# Patient Record
Sex: MALE | Race: WHITE
[De-identification: names, ages, dates, MRNs, and addresses within clinical notes are randomized per-mention and may not be internally consistent; named-entity substitution may affect disease eponyms.]

---

## 2018-01-01 ENCOUNTER — HOSPITAL ENCOUNTER (EMERGENCY)
Dept: HOSPITAL 52 - LL.ED | Age: 43
LOS: 1 days | Discharge: HOME | End: 2018-01-02
Payer: MEDICAID

## 2018-01-01 DIAGNOSIS — J06.9: Primary | ICD-10-CM

## 2018-01-01 DIAGNOSIS — F17.210: ICD-10-CM

## 2018-01-01 DIAGNOSIS — Z79.899: ICD-10-CM

## 2018-01-01 DIAGNOSIS — M19.90: ICD-10-CM

## 2018-01-01 DIAGNOSIS — I10: ICD-10-CM

## 2018-01-01 DIAGNOSIS — E78.00: ICD-10-CM

## 2018-01-01 DIAGNOSIS — J45.909: ICD-10-CM

## 2018-01-01 DIAGNOSIS — K21.9: ICD-10-CM

## 2018-01-01 DIAGNOSIS — E66.9: ICD-10-CM

## 2018-01-01 DIAGNOSIS — Z71.6: ICD-10-CM

## 2018-01-01 PROCEDURE — 87081 CULTURE SCREEN ONLY: CPT

## 2018-01-01 PROCEDURE — 87804 INFLUENZA ASSAY W/OPTIC: CPT

## 2018-01-01 PROCEDURE — 99283 EMERGENCY DEPT VISIT LOW MDM: CPT

## 2018-01-01 PROCEDURE — 96372 THER/PROPH/DIAG INJ SC/IM: CPT

## 2018-01-01 PROCEDURE — 87430 STREP A AG IA: CPT

## 2018-01-02 NOTE — EDM.PDOC
ED HPI GENERAL MEDICAL PROBLEM





- General


Chief Complaint: General


Stated Complaint: sore throat


Time Seen by Provider: 01/02/18 00:10


Source of Information: Reports: Patient, Family (Wife ), Old Records ([facility] EMR.  No paper hospital chart available.)


History Limitations: Reports: No Limitations





- History of Present Illness


INITIAL COMMENTS - FREE TEXT/NARRATIVE: 





Patient was brought to the emergency room via private automobile by his wife 

for evaluation of progressive URI symptoms including sore throat, throat 

fullness, nasal drainage, and yellowish productive cough with symptoms starting 

on about 12/27. Patient has been exposed to many individuals with URI symptoms, 

however no known direct exposure to strep, influenza, mononucleosis, etc. 

Patient did get his influenza booster this season. He has also had some 

intermittent fever and chills, however has not measured his temperature. No 

recent use of antipyretic medication other than low-dose Tylenol PM earlier 

this evening. Patient did start using his inhaler earlier today. He has not 

missed any work to this point. The patient denies any chest pain/pressure, 

heart flutter, dizziness, orthostasis, orthopnea, diaphoresis, paresthesias, 

recent decreased exercise tolerance, or any other anginal-type symptoms. No 

recent history of abdominal pain, heartburn, nausea, diarrhea, melena, gross 

hematochezia, or any food intolerance, including fatty foods, etc.. He has also 

been using Cepacol lozenges and Chloraseptic spray


Onset: Gradual


Onset Date: 12/27/17


Duration: Constant, Getting Worse


Location: Reports: Head (Throat pain is about).  Denies: Face, Neck, Chest, 

Abdomen, Back, Upper Extremity, Left, Upper Extremity, Right, Radiates to


Quality: Reports: Ache, Same as Previous Episode


Severity: Moderate


Improves with: Reports: None


Worsens with: Reports: None


Context: Reports: Other (As above)


Associated Symptoms: Reports: Cough, cough w sputum, Fever/Chills.  Denies: 

Confusion, Chest Pain, Diaphoresis, Headaches, Loss of Appetite, Malaise, Nausea

/Vomiting, Rash, Seizure, Shortness of Breath, Syncope, Weakness


Treatments PTA: Reports: Acetaminophen, Other Medication(s)


Other Treatments PTA: Patient states he took 1 tab Tylenol PM approx. 2130


  ** Oral/Mouth


Pain Score (Numeric/FACES): 8





- Related Data


 Allergies











Allergy/AdvReac Type Severity Reaction Status Date / Time


 


No Known Allergies Allergy   Verified 01/01/18 23:59











Home Meds: 


 Home Meds





Acetaminophen/Diphenhydramine [Tylenol Pm Ex-Strength Caplet] 1 each PO 

ASDIRECTED PRN 01/02/18 [History]


Albuterol Sulfate [Proair Hfa] 8.5 gm IH ASDIRECTED PRN 01/02/18 [History]


Benazepril [Lotensin] 10 mg PO DAILY 01/02/18 [History]


Diclofenac Sodium [Voltaren] 50 mg PO ASDIRECTED PRN 01/02/18 [History]


Montelukast [Singulair] 1 tab PO DAILY 01/02/18 [History]


Pantoprazole [ProTONIX***] 1 cap PO DAILY 01/02/18 [History]


guaiFENesin/Dextromethorphan [Mucinex DM ER 1,200-60 MG] 1 tab PO BID #20 

tbmp.12hr 01/02/18 [Rx]











Past Medical History


HEENT History: Reports: Impaired Vision, Other (See Below).  Denies: Allergic 

Rhinitis, Cataract, Glaucoma, Hard of Hearing, Macular Degeneration, Retinal 

Detachment


Other HEENT History: Patient wears glasses


Cardiovascular History: Reports: High Cholesterol, Hypertension.  Denies: Afib, 

Aneurysm, Arrhythmia, Blood Clots/VTE/DVT, CAD, Heart Murmur, MI, Syncope


Respiratory History: Reports: Asthma.  Denies: COPD, Intubation, Previous, PE, 

Pneumothorax, Sleep Apnea


Gastrointestinal History: Reports: GERD, Pancreatitis, Other (See Below).  

Denies: Celiac Disease, Cholelithiasis, Chronic Constipation, Chronic Diarrhea, 

Gastritis, GI Bleed, Hepatitis, Inflammatory Bowel Disease, Irritable Bowel 

Syndrome, Jaundice, PUD


Other Gastrointestinal History: Pancreatitis of unknown etiology in about 2013


Genitourinary History: Denies: Acute Renal Failure, BPH, Chronic Renal 

Insuffiency, Renal Calculus, STD, Urinary Incontinence, UTI, Recurrent


Musculoskeletal History: Reports: Arthritis, Back Pain, Chronic, Neck Pain, 

Chronic, Osteoarthritis.  Denies: Fracture, Gout, RA, SLE


Neurological History: Reports: None.  Denies: Cerebral Aneurysms, Concussion, 

CVA, Headaches, Chronic, Head Trauma, Migraines, MS, Neuropathy, Peripheral, 

Parkinson's, Seizure, TIA


Psychiatric History: Denies: Abuse, Victim of, ADD, ADHD, Addiction, Anxiety, 

Depression, Psych Hospitalization(s), PTSD, Suicide Attempt, Suicidal Ideation


Endocrine/Metabolic History: Reports: Obesity/BMI 30+.  Denies: Diabetes, Type I

, Diabetes, Type II, Hypothyroidism, IDDM


Hematologic History: Reports: None.  Denies: Anemia, Blood Transfusion(s), Iron 

Deficiency


Immunologic History: Reports: None.  Denies: AIDS, HIV, SLE


Oncologic (Cancer) History: Reports: None.  Denies: Basal Cell Carcinoma, 

Hodgkin's Lymphoma, Leukemia, Lymphoma, Malignant Melanoma, Non-Hodgkin's 

Lymphoma, Squamous Cell Carcinoma


Dermatologic History: Reports: Other (See Below).  Denies: Eczema, Psoriasis


Other Dermatologic History: Acne vulgaris previously





- Infectious Disease History


Infectious Disease History: Denies: C-Difficile, Chicken Pox, Measles, 

Meningitis, Mononucleosis, MRSA, Mumps, Pertussis (Whooping Cough), Rheumatic 

Fever, Rubella, Scarlet Fever, Shingles, TB, VRE





- Past Surgical History


Head Surgeries/Procedures: Reports: None


HEENT Surgical History: Reports: Oral Surgery, Other (See Below).  Denies: 

Adenoidectomy, Eye Surgery, Laser Surgery, LASIK, Myringotomy w Tube(s), Naso-

Sinus Surgery, Tonsillectomy


Other HEENT Surgeries/Procedures: [place] teeth extraction 4 at about age 15


Cardiovascular Surgical History: Reports: None.  Denies: Varicose


Respiratory Surgical History: Reports: None.  Denies: Thoracentesis


GI Surgical History: Denies: Appendectomy, Cholecystectomy, Colonoscopy, EGD, 

Hernia, Abdominal, Hernia, Inguinal, Hernia Repair/Other


Male  Surgical History: Reports: Circumcision.  Denies: TURP-Transurethral 

Resection of Prostate, Vasectomy


Other Male  Surgeries/Procedures: Circumcision as an infant


Endocrine Surgical History: Reports: None.  Denies: Thyroid Biopsy


Neurological Surgical History: Reports: None.  Denies: C-Spine, Discectomy, 

Intracranial, Laminectomy, Lumbar Spine, Sacral Spine, Spinal Fusion, 

Vertebroplasty


Musculoskeletal Surgical History: Reports: None.  Denies: Arthroscopic Procedure

, Carpal Tunnel, Ganglion Cyst, Joint Replacement, ORIF, Shoulder Surgery


Oncologic Surgical History: Reports: None


Dermatological Surgical History: Reports: None





- Past Imaging History


Past Imaging History: Reports: CAT Scan (CT of the cervical spine in 2017), MRI 

(MRI of lumbar spine in about 2008)





Social & Family History





- Tobacco Use


Smoking Status *Q: Current Every Day Smoker


Tobacco Use Within Last Twelve Months: Cigarettes


Years of Tobacco use: 31


Packs/Tins Daily: 0.3 (Maximum of 1 packs per day; patient started smoking at 

age 11)


Used Tobacco, but Quit: No


Smoking Cessation Information Provided To Patient: Yes


Second Hand Smoke Exposure: No


Second Hand Smoke Education Provided: No





- Caffeine Use


Caffeine Use: Reports: Coffee (2 cups per day), Energy Drinks (4 per day), Soda 

(3 sodas per week).  Denies: Tea





- Alcohol Use


Alcohol Use History: Yes


Days Per Week of Alcohol Use: 0 (No previous DWIs, problems with alcohol abuse, 

etc.)


Number of Drinks Per Day: 1 (Straight whiskey usually once every couple months)


Total Drinks Per Week: 0





- Recreational Drug Use


Recreational Drug Use: No


Drug Use in Last 12 Months: No


Recreational Drug Type: Denies: Amphetamines (Speed), Cocaine, Heroin, 

Inhalants (Glues, Solvents, Aerosols), LSD (Acid), Marijuana/Hashish, 

Methamphetamine, Morphine





- Living Situation & Occupation


Living situation: Reports:  (2007, 3 children), with Family (Wife and 

children)


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





ED ROS GENERAL





- Review of Systems


Review Of Systems: See Below


Constitutional: Reports: Fever, Chills, Night Sweats.  Denies: Weakness, Fatigue

, Diaphoresis, Decreased Appetite, Weight Loss, Weight Gain


HEENT: Reports: Rhinitis, Throat Pain, Throat Swelling.  Denies: Dental Pain, 

Ear Discharge, Ear Pain, Eye Pain, Glasses, Vertigo, Vision Change


Respiratory: Reports: Cough, Sputum.  Denies: Shortness of Breath, Wheezing, 

Pleuritic Chest Pain


Cardiovascular: Reports: No Symptoms.  Denies: Chest Pain, Blood Pressure 

Problem, Dyspnea on Exertion, Edema, Lightheadedness, Orthopnea, Palpitations, 

Syncope


Endocrine: Reports: No Symptoms.  Denies: Fatigue


GI/Abdominal: Reports: Difficulty Swallowing (Secondary to pharyngitis).  Denies

: Abdominal Pain, Anorexia, Black Stool, Bloody Stool, Constipation, Diarrhea, 

Decreased Appetite, Distension, Flatus, Hematochezia, Melena, Nausea, Stool 

Incontinence, Vomiting


: Reports: No Symptoms.  Denies: Discharge, Dysuria, Flank Pain, Frequency, 

Hematuria, Incontinence, Pain, Urgency, Urinary Retention


Musculoskeletal: Reports: No Symptoms.  Denies: Neck Pain, Shoulder Pain, Arm 

Pain, Back Pain, Leg Pain


Skin: Reports: No Symptoms.  Denies: Diaphoresis, Rash, Wound


Neurological: Reports: No Symptoms.  Denies: Confusion, Dizziness, Headache, 

Numbness, Paresthesia, Seizure, Tingling


Psychiatric: Reports: No Symptoms.  Denies: Agitation, Anxiety, Confusion, 

Depression, Hallucinations, Suicidal Ideation


Hematologic/Lymphatic: Reports: No Symptoms


Immunologic: Reports: No Symptoms





ED EXAM, GENERAL





- Physical Exam


Exam: See Below


Exam Limited By: No Limitations


General Appearance: Alert, WD/WN, No Apparent Distress


Eye Exam: Bilateral Eye: EOMI, Normal Inspection (No nystagmus), PERRL


Ears: Normal External Exam, Normal Canal, Hearing Grossly Normal, Normal TMs


Nose: Normal Mucosa, No Blood, Nasal Drainage, Clear Rhinorrhea (Mild bilateral)

.  No: Nasal Tenderness, Nasal Swelling


Throat/Mouth: Normal Lips, Normal Teeth, Normal Gums, Normal Oropharynx (+2 

erythema in the posterior pharynx with no pinpoint white exudates or 

peritonsillar abscess, uvular swelling, etc.), No Airway Compromise.  No: 

Normal Voice (Moderate hoarseness), Dysphagia, Perioral Cyanosis


Head: Atraumatic, Normocephalic.  No: Facial Swelling, Facial Tenderness, Sinus 

Tenderness


Neck: Normal Inspection, Supple, Non-Tender, Full Range of Motion.  No: 

Lymphadenopathy (L), Lymphadenopathy (R), Thyromegaly


Respiratory/Chest: No Respiratory Distress, Lungs Clear, Normal Breath Sounds, 

No Accessory Muscle Use, Chest Non-Tender.  No: Pleural Rub, Retractions


Cardiovascular: Normal Peripheral Pulses, Regular Rate, Rhythm, No Edema, No 

Gallop, No JVD, No Murmur, No Rub.  No: Gallop/S3, Gallop/S4, Friction Rub


Peripheral Pulses: 2+: Radial (L), Radial (R)


GI/Abdominal: Normal Bowel Sounds, Soft, Non-Tender, No Organomegaly, No 

Distention, No Abnormal Bruit, No Mass, Other (obese).  No: Guarding


 (Male) Exam: Deferred


Rectal (Males) Exam: Deferred


Back Exam: Normal Inspection, Full Range of Motion.  No: CVA Tenderness (L), 

CVA Tenderness (R), Muscle Spasm


Extremities: Normal Range of Motion, Non-Tender, No Pedal Edema, Normal 

Capillary Refill.  No: Pedal Edema (Trace bilateral pedal/pretibial edema), 

Ever's Sign


Neurological: Alert, Oriented, CN II-XII Intact, Normal Cognition, Normal Gait, 

No Motor/Sensory Deficits


Psychiatric: Normal Affect, Normal Mood


Skin Exam: Warm, Dry, Intact, Normal Color, No Rash.  No: Diaphoretic, Wound/

Incision


Lymphatic: No Adenopathy





Course





- Vital Signs


Last Recorded V/S: 


 Last Vital Signs











Temp  37.0 C   01/01/18 23:50


 


Pulse  95   01/01/18 23:50


 


Resp  20   01/01/18 23:50


 


BP  145/80 H  01/01/18 23:50


 


Pulse Ox  95   01/01/18 23:50














 Vital Signs - 24 hr











  01/01/18





  23:50


 


Temperature [ 37.0 C





Temporal] 


 


Pulse, 95





Peripheral [ 





Pulse Oximetry] 


 


Respiratory 20





Rate 


 


Blood Pressure 145/80 H





[Left Upper Arm 





] 


 


O2 Sat by Pulse 95





Oximetry 














- Orders/Labs/Meds


Orders: 


 Active Orders 24 hr











 Category Date Time Status


 


 CULTURE STREP A CONFIRMATION [] Stat Lab  01/02/18 00:20 Results


 


 STREP SCRN A RAPID W CULT CONF [] Stat Lab  01/02/18 00:17 Ordered


 


 Obtain Past Medical Record [OM.PC] Routine Oth  01/02/18 00:17 Active











Labs: 








 Microbiology











 01/02/18 00:20 Influenza Type A Antigen Screen - Final





 Nasopharyngeal Swab - Nare, Right    NEGATIVE INFLUENZA A VIRUS AG





 Influenza Type B Antigen Screen - Final





    NEGATIVE INFLUENZA B VIRUS AG


 


 01/02/18 00:20 Group A Streptococcus Rapid Screen - Final





 Throat    NEGATIVE STREP A SCREEN











Meds: 


Medications














Discontinued Medications














Generic Name Dose Route Start Last Admin





  Trade Name Freq  PRN Reason Stop Dose Admin


 


Methylprednisolone Acetate  80 mg  01/02/18 00:22  01/02/18 00:25





  Depo-Medrol  IM  01/02/18 00:23  80 mg





  ONETIME ONE   Administration














- Radiology Interpretation


Free Text/Narrative:: 





None





Departure





- Departure


Time of Disposition: 01:00


Disposition: Home, Self-Care 01


Condition: Good


Clinical Impression: 


 Asthma, Hypertension, Osteoarthritis, Obesity (BMI 35.0-39.9 without 

comorbidity), Peptic reflux disease, Respiratory infection, upper, Tobacco 

abuse counseling








- Discharge Information


Prescriptions: 


guaiFENesin/Dextromethorphan [Mucinex DM ER 1,200-60 MG] 1 tab PO BID #20 

tbmp.12hr


Instructions:  Sore Throat, Easy-to-Read


Referrals: 


Nidia Mooney PA [Primary Care Provider] - 


Forms:  ED Department Discharge, ED Return to Work/School Form


Additional Instructions: 


1.  Follow up with your regular provider in 10-14 days as needed, if symptoms 

persist.


2.  Tylenol 650 mg by mouth every 4 hours and/or OTC ibuprofen 2-3 tabs by 

mouth every 6 hours with food as directed./needed.


3.  Listerine gargles four times per day, after meals and at bedtime, with 

additional Chloroseptic lozenges or spray as needed for 10 days and/or until 

symptoms resolve.


4.  Hygiene precautions as discussed


5.  Weight loss in moderation


6.  Stop all tobacco use ASAP as directed/per provided information and consider 

contacting Quit LIne, etc..


7.  Work excuse- See Form





- Problem List & Annotations


(1) Respiratory infection, upper


SNOMED Code(s): 53024233


   Code(s): J06.9 - ACUTE UPPER RESPIRATORY INFECTION, UNSPECIFIED   Status: 

Acute   Priority: High   Onset Date: ~12/27/17   Annotation/Comment:: IM Depo-

Medrol given for throat inflammation and as asthma prophylaxis as below. No 

indication for antibiotic therapy at this time with likely viral pharyngitis, 

viral bronchitis and mild URI symptoms. Symptomatic relief as per discharge 

instructions. Work excuse provided   


Qualifiers: 


   URI type: acute pharyngitis 





(2) Asthma


SNOMED Code(s): 535409930


   Code(s): J45.909 - UNSPECIFIED ASTHMA, UNCOMPLICATED   Status: Chronic   

Priority: Medium   Annotation/Comment:: No significant asthma exacerbation or 

current wheezes, etc. Note probable viral bronchitis. Compliance with inhaler 

encouraged.   


Qualifiers: 


   Asthma severity: mild   Asthma persistence: intermittent   Asthma 

complication type: uncomplicated   Qualified Code(s): J45.20 - Mild 

intermittent asthma, uncomplicated   





(3) Hypertension


SNOMED Code(s): 37650003


   Code(s): I10 - ESSENTIAL (PRIMARY) HYPERTENSION   Status: Chronic   Priority

: Medium   Annotation/Comment:: Stable by history. Currently treated   


Qualifiers: 


   Hypertension type: essential hypertension   Qualified Code(s): I10 - 

Essential (primary) hypertension   





(4) Obesity (BMI 35.0-39.9 without comorbidity)


SNOMED Code(s): 003959929


   Code(s): E66.9 - OBESITY, UNSPECIFIED   Status: Chronic   Priority: Medium   

Annotation/Comment:: Weight loss in moderation advised   





(5) Osteoarthritis


SNOMED Code(s): 009218585


   Code(s): M19.90 - UNSPECIFIED OSTEOARTHRITIS, UNSPECIFIED SITE   Status: 

Chronic   Priority: Medium   Annotation/Comment:: Stable by history   


Qualifiers: 


   Osteoarthritis location: multiple joints   Osteoarthritis type: primary   

Qualified Code(s): M15.0 - Primary generalized (osteo)arthritis   





(6) Peptic reflux disease


SNOMED Code(s): 84305106


   Code(s): K21.9 - GASTRO-ESOPHAGEAL REFLUX DISEASE WITHOUT ESOPHAGITIS   

Status: Chronic   Priority: Medium   Annotation/Comment:: Stable under current 

medical therapy   





(7) Tobacco abuse counseling


SNOMED Code(s): 871561041, 249314946


   Code(s): Z71.6 - TOBACCO ABUSE COUNSELING   Status: Chronic   Priority: 

Medium   Annotation/Comment:: Tobacco cessation strongly encouraged with 

information provided at discharge   





- Problem List Review


Problem List Initiated/Reviewed/Updated: Yes





- My Orders


Last 24 Hours: 


My Active Orders





01/02/18 00:17


STREP SCRN A RAPID W CULT CONF [RM] Stat 


Obtain Past Medical Record [OM.PC] Routine 





01/02/18 00:20


CULTURE STREP A CONFIRMATION [RM] Stat 














- Assessment/Plan


Last 24 Hours: 


My Active Orders





01/02/18 00:17


STREP SCRN A RAPID W CULT CONF [RM] Stat 


Obtain Past Medical Record [OM.PC] Routine 





01/02/18 00:20


CULTURE STREP A CONFIRMATION [RM] Stat 











Assessment:: 





As above


Plan: 





As above. Extensive precautions were given to the patient and his wife, who are 

in agreement with the treatment plan. See Patient Instructions for further 

treatment and plan.

## 2018-02-28 ENCOUNTER — HOSPITAL ENCOUNTER (EMERGENCY)
Dept: HOSPITAL 52 - LL.ED | Age: 43
Discharge: HOME | End: 2018-02-28
Payer: MEDICAID

## 2018-02-28 DIAGNOSIS — Z79.899: ICD-10-CM

## 2018-02-28 DIAGNOSIS — I10: ICD-10-CM

## 2018-02-28 DIAGNOSIS — M54.41: Primary | ICD-10-CM

## 2018-02-28 DIAGNOSIS — E78.00: ICD-10-CM

## 2018-02-28 DIAGNOSIS — F17.210: ICD-10-CM

## 2018-02-28 NOTE — EDM.PDOC
ED HPI GENERAL MEDICAL PROBLEM





- General


Chief Complaint: General


Stated Complaint: Work Related Injury


Time Seen by Provider: 02/28/18 10:15


Source of Information: Reports: Patient


History Limitations: Reports: No Limitations





- History of Present Illness


INITIAL COMMENTS - FREE TEXT/NARRATIVE: 





Patient presents with acute low back pain that started last evening when he was 

working at [employer]. Patient was moving a cart full of water when he felt sudden 

mid low back pain.  Complains of radiation down right leg when he is standing 

at times. Does have history of previous low back pain episodes in past but 

denies chronic daily pain. No bowel or bladder dysfunction.  No numbness/

tingling down legs. Denies other complaints. Patient has history of obesity, 

asthma, high blood pressure and GERD. He did tell his employer last night that 

his back hurt.  This morning after waking up patient noted that overall pain 

was worse and that is when he came to the ER. 


Treatments PTA: Reports: Cold Therapy, NSAIDS


  ** Lower Back


Pain Score (Numeric/FACES): 8





- Related Data


 Allergies











Allergy/AdvReac Type Severity Reaction Status Date / Time


 


No Known Allergies Allergy   Verified 01/01/18 23:59











Home Meds: 


 Home Meds





Albuterol Sulfate [Proair Hfa] 8.5 gm IH ASDIRECTED PRN 01/02/18 [History]


Benazepril [Lotensin] 10 mg PO DAILY 01/02/18 [History]


Montelukast [Singulair] 1 tab PO DAILY 01/02/18 [History]


Pantoprazole [ProTONIX***] 1 cap PO DAILY 01/02/18 [History]


Cyclobenzaprine [Flexeril] 10 mg PO TID PRN #15 tab 02/28/18 [Rx]


Gabapentin [Neurontin] 300 mg PO DAILY 02/28/18 [History]


Non-Formulary Medication [NF Drug] 1 applic TOP DAILY 02/28/18 [History]


Prednisone [IMW: predniSONE] 40 mg PO WITHBREAKFAST #10 tab 02/28/18 [Rx]


traMADol [Ultram] 50 mg PO Q6H PRN #15 tab 02/28/18 [Rx]











Past Medical History


HEENT History: Reports: Impaired Vision, Other (See Below).  Denies: Allergic 

Rhinitis, Cataract, Glaucoma, Hard of Hearing, Macular Degeneration, Retinal 

Detachment


Other HEENT History: Patient wears glasses


Cardiovascular History: Reports: High Cholesterol, Hypertension.  Denies: Afib, 

Aneurysm, Arrhythmia, Blood Clots/VTE/DVT, CAD, Heart Murmur, MI, Syncope


Respiratory History: Reports: Asthma.  Denies: COPD, Intubation, Previous, PE, 

Pneumothorax, Sleep Apnea


Gastrointestinal History: Reports: GERD, Pancreatitis, Other (See Below).  

Denies: Celiac Disease, Cholelithiasis, Chronic Constipation, Chronic Diarrhea, 

Gastritis, GI Bleed, Hepatitis, Inflammatory Bowel Disease, Irritable Bowel 

Syndrome, Jaundice, PUD


Other Gastrointestinal History: Pancreatitis of unknown etiology in about 2013


Musculoskeletal History: Reports: Arthritis, Back Pain, Chronic, Neck Pain, 

Chronic, Osteoarthritis.  Denies: Fracture, Gout, RA, SLE


Neurological History: Reports: None.  Denies: Cerebral Aneurysms, Concussion, 

CVA, Headaches, Chronic, Head Trauma, Migraines, MS, Neuropathy, Peripheral, 

Parkinson's, Seizure, TIA


Endocrine/Metabolic History: Reports: Obesity/BMI 30+.  Denies: Diabetes, Type I

, Diabetes, Type II, Hypothyroidism, IDDM


Hematologic History: Reports: None.  Denies: Anemia, Blood Transfusion(s), Iron 

Deficiency


Immunologic History: Reports: None.  Denies: AIDS, HIV, SLE


Oncologic (Cancer) History: Reports: None.  Denies: Basal Cell Carcinoma, 

Hodgkin's Lymphoma, Leukemia, Lymphoma, Malignant Melanoma, Non-Hodgkin's 

Lymphoma, Squamous Cell Carcinoma


Dermatologic History: Reports: Other (See Below).  Denies: Eczema, Psoriasis


Other Dermatologic History: Acne vulgaris previously





- Past Surgical History


Head Surgeries/Procedures: Reports: None


HEENT Surgical History: Reports: Oral Surgery, Other (See Below).  Denies: 

Adenoidectomy, Eye Surgery, Laser Surgery, LASIK, Myringotomy w Tube(s), Naso-

Sinus Surgery, Tonsillectomy


Other HEENT Surgeries/Procedures: [place] teeth extraction 4 at about age 15


Cardiovascular Surgical History: Reports: None.  Denies: Varicose


Respiratory Surgical History: Reports: None.  Denies: Thoracentesis


Male  Surgical History: Reports: Circumcision.  Denies: TURP-Transurethral 

Resection of Prostate, Vasectomy


Other Male  Surgeries/Procedures: Circumcision as an infant


Endocrine Surgical History: Reports: None.  Denies: Thyroid Biopsy


Neurological Surgical History: Reports: None.  Denies: C-Spine, Discectomy, 

Intracranial, Laminectomy, Lumbar Spine, Sacral Spine, Spinal Fusion, 

Vertebroplasty


Musculoskeletal Surgical History: Reports: None.  Denies: Arthroscopic Procedure

, Carpal Tunnel, Ganglion Cyst, Joint Replacement, ORIF, Shoulder Surgery


Oncologic Surgical History: Reports: None


Dermatological Surgical History: Reports: None





- Past Imaging History


Past Imaging History: Reports: CAT Scan (CT of the cervical spine in 2017), MRI 

(MRI of lumbar spine in about 2008)





Social & Family History





- Family History


Family Medical History: Noncontributory (for complaint)





- Tobacco Use


Smoking Status *Q: Current Every Day Smoker


Years of Tobacco use: 31


Packs/Tins Daily: 0.3


Used Tobacco, but Quit: No


Second Hand Smoke Exposure: No





- Caffeine Use


Caffeine Use: Reports: Coffee, Energy Drinks, Soda


Caffeine Use Comment: Rarely





- Alcohol Use


Days Per Week of Alcohol Use: 0 (No previous DWIs, problems with alcohol abuse, 

etc.)


Number of Drinks Per Day: 1 (Straight whiskey usually once every couple months)


Total Drinks Per Week: 0





- Recreational Drug Use


Recreational Drug Use: No


Drug Use in Last 12 Months: No





- Living Situation & Occupation


Living situation: Reports:  (2007, 3 children), with Family (Wife and 

children)


Occupation: Employed (Manager at [employer])





ED ROS GENERAL





- Review of Systems


Review Of Systems: See Below


Constitutional: Reports: No Symptoms


HEENT: Reports: No Symptoms


Respiratory: Reports: No Symptoms


Cardiovascular: Reports: No Symptoms


GI/Abdominal: Reports: No Symptoms.  Denies: Stool Incontinence


: Reports: No Symptoms.  Denies: Incontinence


Musculoskeletal: Reports: Back Pain, Leg Pain


Skin: Reports: No Symptoms


Neurological: Reports: No Symptoms.  Denies: Numbness, Paresthesia, Tingling


Psychiatric: Reports: No Symptoms





ED EXAM, GENERAL





- Physical Exam


Exam: See Below


Exam Limited By: No Limitations


General Appearance: Alert, WD/WN, Other (uncomfortable when changing position)


Eye Exam: Bilateral Eye: EOMI, PERRL


Ears: Normal External Exam


Head: Atraumatic, Normocephalic


Respiratory/Chest: No Respiratory Distress, Lungs Clear, Normal Breath Sounds, 

No Accessory Muscle Use


Cardiovascular: Regular Rate, Rhythm, No Murmur


GI/Abdominal: Soft, Non-Tender, Other (obese)


 (Male) Exam: Deferred


Rectal (Males) Exam: Deferred


Back Exam: Paraspinal Tenderness (bilaterally, mid to lower back, left greater 

than right. No tenderness with palpation in gluteal area. ).  No: Muscle Spasm, 

Vertebral Tenderness


Extremities: Non-Tender, Normal Capillary Refill.  No: Leg Pain


Neurological: Alert, Oriented, Normal Cognition, Normal Gait, Normal Reflexes, 

No Motor/Sensory Deficits


Psychiatric: Normal Affect, Normal Mood


Skin Exam: Warm, Dry, Intact, Normal Color





Course





- Vital Signs


Last Recorded V/S: 


 Last Vital Signs











Temp  36.2 C   02/28/18 09:37


 


Pulse  84   02/28/18 09:37


 


Resp  18   02/28/18 09:37


 


BP  164/95 H  02/28/18 09:37


 


Pulse Ox  98   02/28/18 09:37














- Orders/Labs/Meds


Orders: 


 Active Orders 24 hr











 Category Date Time Status


 


 Lumbar Spine Min 4V [CR] Stat Exams  02/28/18 09:38 Taken














- Radiology Interpretation


Free Text/Narrative:: 





Some narrowing of disc space L4/5, some degenerative changes noted. 





- Re-Assessments/Exams


Free Text/Narrative Re-Assessment/Exam: 





02/28/18 11:21


Acute low back pain/sciatica. 


Conservative treatment at this time, including rest/ice/steroids, PRN Flexeril 

and Tramadol. 


OK to return to restricted duty on Friday. To follow up with primary clinic 

Monday for recheck and further planning as needed, such as MRI consideration if 

pain has not significantly improved. Patient is in agreement with plan. 





Departure





- Departure


Time of Disposition: 10:39


Disposition: Home, Self-Care 01


Condition: Good


Clinical Impression: 


Low back pain


Qualifiers:


 Chronicity: acute Back pain laterality: bilateral Sciatica presence: with 

sciatica Sciatica laterality: sciatica of right side Qualified Code(s): M54.41 

- Lumbago with sciatica, right side








- Discharge Information


Prescriptions: 


Cyclobenzaprine [Flexeril] 10 mg PO TID PRN #15 tab


 PRN Reason: Spasms


Prednisone [IMW: predniSONE] 40 mg PO WITHBREAKFAST #10 tab


traMADol [Ultram] 50 mg PO Q6H PRN #15 tab


 PRN Reason: Pain


Instructions:  Back Pain, Adult, Easy-to-Read


Referrals: 


Nidia Mooney PA [Primary Care Provider] - 


Forms:  ED Department Discharge


Additional Instructions: 


Rest, gentle activity for today and tomorrow. You may return to work on 

restriction Friday while you finish your course of medication. Make an 

appointment to follow up with your primary provider Monday morning for re-

evluation and further restrictions as needed. Additional imaging studies may be 

needed if the pain/problems continue. 














- My Orders


Last 24 Hours: 


My Active Orders





02/28/18 09:38


Lumbar Spine Min 4V [CR] Stat 














- Assessment/Plan


Last 24 Hours: 


My Active Orders





02/28/18 09:38


Lumbar Spine Min 4V [CR] Stat

## 2020-01-01 NOTE — EDM.PDOC
ED HPI GENERAL MEDICAL PROBLEM





- General


Chief Complaint: General


Stated Complaint: shaking, rapid heart rate


Time Seen by Provider: 01/01/20 16:45


Source of Information: Reports: Patient


History Limitations: Reports: No Limitations





- History of Present Illness


INITIAL COMMENTS - FREE TEXT/NARRATIVE: 





Pt feels weak and nauseated  No emesis  No diarrhea  Did have alcohol last 

night  Also with caffeine and soup today


Onset: Gradual


Duration: Hour(s):


Location: Reports: Generalized


Associated Symptoms: Reports: Diaphoresis, Headaches, Loss of Appetite, Nausea/

Vomiting


Treatments PTA: Reports: Acetaminophen





- Related Data


 Allergies











Allergy/AdvReac Type Severity Reaction Status Date / Time


 


No Known Allergies Allergy   Verified 01/01/20 16:37











Home Meds: 


 Home Meds





Albuterol Sulfate [Proair Hfa] 8.5 gm IH ASDIRECTED PRN 01/02/18 [History]


Benazepril [Lotensin] 20 mg PO DAILY 01/02/18 [History]


Montelukast [Singulair] 1 tab PO DAILY 01/02/18 [History]


Pantoprazole [ProTONIX***] 1 cap PO DAILY 01/02/18 [History]


Gabapentin [Neurontin] 300 mg PO DAILY 02/28/18 [History]


Non-Formulary Medication [NF Drug] 1 applic TOP DAILY 02/28/18 [History]


Acetaminophen 3 tab PO ONETIME 01/01/20 [History]


Ferrous Sulfate [Iron] 6 tab PO DAILY 01/01/20 [History]


Levothyroxine 25 mcg PO DAILY 01/01/20 [History]


Milk Thistle/Nac/Dandel/Turmer [Liver Complex Tablet] 1 tab PO DAILY 01/01/20 [

History]











Past Medical History


HEENT History: Reports: Impaired Vision, Other (See Below).  Denies: Allergic 

Rhinitis, Cataract, Glaucoma, Hard of Hearing, Macular Degeneration, Retinal 

Detachment


Other HEENT History: Patient wears glasses


Cardiovascular History: Reports: High Cholesterol, Hypertension.  Denies: Afib, 

Aneurysm, Arrhythmia, Blood Clots/VTE/DVT, CAD, Heart Murmur, MI, Syncope


Respiratory History: Reports: Asthma.  Denies: COPD, Intubation, Previous, PE, 

Pneumothorax, Sleep Apnea


Gastrointestinal History: Reports: GERD, Pancreatitis, Other (See Below).  

Denies: Celiac Disease, Cholelithiasis, Chronic Constipation, Chronic Diarrhea, 

Gastritis, GI Bleed, Hepatitis, Inflammatory Bowel Disease, Irritable Bowel 

Syndrome, Jaundice, PUD


Other Gastrointestinal History: Pancreatitis of unknown etiology in about 2013


Musculoskeletal History: Reports: Arthritis, Back Pain, Chronic, Neck Pain, 

Chronic, Osteoarthritis.  Denies: Fracture, Gout, RA, SLE


Neurological History: Reports: None.  Denies: Cerebral Aneurysms, Concussion, 

CVA, Headaches, Chronic, Head Trauma, Migraines, MS, Neuropathy, Peripheral, 

Parkinson's, Seizure, TIA


Endocrine/Metabolic History: Reports: Obesity/BMI 30+.  Denies: Diabetes, Type I

, Diabetes, Type II, Hypothyroidism, IDDM


Hematologic History: Reports: None.  Denies: Anemia, Blood Transfusion(s), Iron 

Deficiency


Immunologic History: Reports: None.  Denies: AIDS, HIV, SLE


Oncologic (Cancer) History: Reports: None.  Denies: Basal Cell Carcinoma, 

Hodgkin's Lymphoma, Leukemia, Lymphoma, Malignant Melanoma, Non-Hodgkin's 

Lymphoma, Squamous Cell Carcinoma


Dermatologic History: Reports: Other (See Below).  Denies: Eczema, Psoriasis


Other Dermatologic History: Acne vulgaris previously





- Past Surgical History


Head Surgeries/Procedures: Reports: None


HEENT Surgical History: Reports: Oral Surgery, Other (See Below).  Denies: 

Adenoidectomy, Eye Surgery, Laser Surgery, LASIK, Myringotomy w Tube(s), Naso-

Sinus Surgery, Tonsillectomy


Other HEENT Surgeries/Procedures: [place] teeth extraction 4 at about age 15


Cardiovascular Surgical History: Reports: None.  Denies: Varicose


Respiratory Surgical History: Reports: None.  Denies: Thoracentesis


Male  Surgical History: Reports: Circumcision.  Denies: TURP-Transurethral 

Resection of Prostate, Vasectomy


Other Male  Surgeries/Procedures: Circumcision as an infant


Endocrine Surgical History: Reports: None.  Denies: Thyroid Biopsy


Neurological Surgical History: Reports: None.  Denies: C-Spine, Discectomy, 

Intracranial, Laminectomy, Lumbar Spine, Sacral Spine, Spinal Fusion, 

Vertebroplasty


Musculoskeletal Surgical History: Reports: None.  Denies: Arthroscopic Procedure

, Carpal Tunnel, Ganglion Cyst, Joint Replacement, ORIF, Shoulder Surgery


Oncologic Surgical History: Reports: None


Dermatological Surgical History: Reports: None





- Past Imaging History


Past Imaging History: Reports: CAT Scan (CT of the cervical spine in 2017), MRI 

(MRI of lumbar spine in about 2008)





Social & Family History





- Family History


Family Medical History: Noncontributory (for complaint)





- Caffeine Use


Caffeine Use: Reports: Coffee, Energy Drinks, Soda


Caffeine Use Comment: Rarely





- Living Situation & Occupation


Living situation: Reports:  (2007, 3 children), with Family (Wife and 

children)


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





ED ROS GENERAL





- Review of Systems


Review Of Systems: See Below


Constitutional: Reports: Diaphoresis


Respiratory: Reports: No Symptoms


Cardiovascular: Reports: Palpitations


GI/Abdominal: Reports: Nausea


Neurological: Reports: No Symptoms





ED EXAM, GENERAL





- Physical Exam


Exam: See Below


Exam Limited By: No Limitations


General Appearance: Mild Distress


Throat/Mouth: Normal Oropharynx


Neck: Supple


Respiratory/Chest: Lungs Clear


Cardiovascular: Regular Rate, Rhythm


GI/Abdominal: Soft, Non-Tender


Back Exam: Normal Inspection


Extremities: Normal Inspection





Course





- Vital Signs


Last Recorded V/S: 





 Last Vital Signs











Temp  36.4 C   01/01/20 16:36


 


Pulse  113 H  01/01/20 16:36


 


Resp  19   01/01/20 16:36


 


BP  150/92 H  01/01/20 16:36


 


Pulse Ox  96   01/01/20 16:36














- Orders/Labs/Meds


Labs: 





 Laboratory Tests











  01/01/20 01/01/20 Range/Units





  17:00 17:00 


 


WBC  6.4   (4.0-10.2)  K/uL


 


RBC  5.43 H   (4.33-5.41)  M/uL


 


Hgb  17.2 H D   (13.1-16.8)  g/dL


 


Hct  48.8   (39.0-49.0)  %


 


MCV  89.9  D   (84.0-98.0)  fL


 


MCH  31.7   (28.2-33.3)  pg


 


MCHC  35.2   (31.7-36.0)  g/dL


 


RDW  13.2   (11.2-14.1)  %


 


Plt Count  166  D   (150-350)  K/uL


 


Neut % (Auto)  31.1 L   (45.0-80.0)  %


 


Lymph % (Auto)  55.3 H   (10.0-50.0)  %


 


Mono % (Auto)  10.4   (2.0-14.0)  %


 


Eos % (Auto)  1.9   (0.0-5.0)  %


 


Baso % (Auto)  1.3   (0.0-2.0)  %


 


Neut # (Auto)  1.98   (1.40-7.00)  K/uL


 


Lymph # (Auto)  3.51 H   (0.50-3.50)  K/uL


 


Mono # (Auto)  0.66   (0.00-1.00)  K/uL


 


Eos # (Auto)  0.12   (0.00-0.50)  K/uL


 


Baso # (Auto)  0.08   (0.00-0.20)  K/uL


 


Sodium   140  (136-145)  mmol/L


 


Potassium   3.8  (3.5-5.1)  mmol/L


 


Chloride   102  ()  mmol/L


 


Carbon Dioxide   25.5  (21.0-32.0)  mmol/L


 


BUN   10  (7-18)  mg/dL


 


Creatinine   0.72  (0.51-1.17)  mg/dL


 


Est Cr Clr Drug Dosing   TNP  


 


Estimated GFR (MDRD)   > 60  mL/min


 


Glucose   130 H  ()  mg/dL


 


Calcium   9.2  (8.5-10.1)  mg/dL














- Re-Assessments/Exams


Free Text/Narrative Re-Assessment/Exam: 





01/01/20 17:28


Pt stable in ER  See lab





Departure





- Departure


Time of Disposition: 17:30


Disposition: Home, Self-Care 01


Condition: Good


Clinical Impression: 


 Weakness








- Discharge Information


Instructions:  Weakness, Easy-to-Read, Near-Syncope, Easy-to-Read


Referrals: 


Halle Wilson, NP [Primary Care Provider] - 


Additional Instructions: 


Follow up in clinic


Return to ER if worse





Sepsis Event Note





- Evaluation


Sepsis Screening Result: No Definite Risk





- Focused Exam


Vital Signs: 





 Vital Signs











  Temp Pulse Resp BP Pulse Ox


 


 01/01/20 16:36  36.4 C  113 H  19  150/92 H  96











Date Exam was Performed: 01/01/20


Time Exam was Performed: 17:25

## 2020-01-02 NOTE — PCM.HP.2
H&P History of Present Illness





- General


Date of Service: 01/02/20


Admit Problem/Dx: 





[facility] presented [facility] with complaints of not feeling well 

since yesterday around noon.  It started with feeling shaky, jittery, chilled, 

weak, body aches, dizziness and foggy headed.  He also complained of associated 

chest pain that he described as a constant light pressure (1/10 on the pain 

scale), difficulty catching his breath while coughing.  Cough is dry and worse 

with lying down.  He had eaten some Mexican soup just prior to feeling ill 

yesterday.  Since then he has only had a banana.  He has been feeling nauseated 

and only taking small drinks of fluids.  It also sounds like he has symptoms of 

sleep apnea. He was evaluated in the ER yesterday afternoon and discharged home 

after labs were found to be satisfactory.  Since his symptoms have worsened 

presents for a reevaluation.


Source of Information: Patient, Significant Other


History Limitations: Reports: No Limitations





- History of Present Illness


Onset of Symptoms: Reports: Sudden


Symptom Onset Date: 01/01/20 (around noon)


Duration of Symptoms: Reports: Day(s):, Getting Worse


Associated Symptoms: Reports: Chest Pain, Cough, Fever/Chills, Loss of Appetite

, Malaise, Nausea/Vomiting, Shortness of Breath, Weakness, Other


Other HPI/Comments: 





weakness





- Related Data


Allergies/Adverse Reactions: 


 Allergies











Allergy/AdvReac Type Severity Reaction Status Date / Time


 


No Known Allergies Allergy   Verified 01/01/20 16:37











Home Medications: 


 Home Meds





Albuterol Sulfate [Proair Hfa] 8.5 gm IH ASDIRECTED PRN 01/02/18 [History]


Montelukast [Singulair] 10 mg PO DAILY 01/02/18 [History]


Pantoprazole [ProTONIX***] 40 mg PO DAILY 01/02/18 [History]


Non-Formulary Medication [NF Drug] 1 applic TOP DAILY 02/28/18 [History]


Levothyroxine 25 mcg PO DAILY 01/01/20 [History]


Albuterol/Ipratropium [DuoNeb 3.0-0.5 MG/3 ML] 3 ml INH QID PRN 01/02/20 [

History]


Amitriptyline [Elavil] 50 mg PO BEDTIME 01/02/20 [History]


Benazepril [Lotensin] 20 mg PO BID@0800,2000 01/02/20 [History]


Budesonide/Formoterol [Symbicort 160-4.5 MCG] 2 puff INH BID@0800,2000 01/02/20 

[History]


Gabapentin [Neurontin] 800 mg PO BID@0800,2000 01/02/20 [History]


Naproxen 500 mg PO DAILY PRN 01/02/20 [History]











Past Medical History


HEENT History: Reports: Impaired Vision, Other (See Below).  Denies: Allergic 

Rhinitis, Cataract, Glaucoma, Hard of Hearing, Macular Degeneration, Retinal 

Detachment


Other HEENT History: Patient wears glasses


Cardiovascular History: Reports: High Cholesterol, Hypertension.  Denies: Afib, 

Aneurysm, Arrhythmia, Blood Clots/VTE/DVT, CAD, Heart Murmur, MI, Syncope


Respiratory History: Reports: Asthma.  Denies: COPD, Intubation, Previous, PE, 

Pneumothorax, Sleep Apnea


Gastrointestinal History: Reports: GERD, Pancreatitis, Other (See Below).  

Denies: Celiac Disease, Cholelithiasis, Chronic Constipation, Chronic Diarrhea, 

Gastritis, GI Bleed, Hepatitis, Inflammatory Bowel Disease, Irritable Bowel 

Syndrome, Jaundice, PUD


Other Gastrointestinal History: Pancreatitis of unknown etiology in about 2013


Musculoskeletal History: Reports: Arthritis, Back Pain, Chronic, Neck Pain, 

Chronic, Osteoarthritis.  Denies: Fracture, Gout, RA, SLE


Neurological History: Reports: None.  Denies: Cerebral Aneurysms, Concussion, 

CVA, Headaches, Chronic, Head Trauma, Migraines, MS, Neuropathy, Peripheral, 

Parkinson's, Seizure, TIA


Endocrine/Metabolic History: Reports: Obesity/BMI 30+.  Denies: Diabetes, Type I

, Diabetes, Type II, Hypothyroidism, IDDM


Hematologic History: Reports: None.  Denies: Anemia, Blood Transfusion(s), Iron 

Deficiency


Immunologic History: Reports: None.  Denies: AIDS, HIV, SLE


Oncologic (Cancer) History: Reports: None.  Denies: Basal Cell Carcinoma, 

Hodgkin's Lymphoma, Leukemia, Lymphoma, Malignant Melanoma, Non-Hodgkin's 

Lymphoma, Squamous Cell Carcinoma


Dermatologic History: Reports: Other (See Below).  Denies: Eczema, Psoriasis


Other Dermatologic History: Acne vulgaris previously





- Past Surgical History


Head Surgeries/Procedures: Reports: None


HEENT Surgical History: Reports: Oral Surgery, Other (See Below).  Denies: 

Adenoidectomy, Eye Surgery, Laser Surgery, LASIK, Myringotomy w Tube(s), Naso-

Sinus Surgery, Tonsillectomy


Other HEENT Surgeries/Procedures: [place] teeth extraction 4 at about age 15


Cardiovascular Surgical History: Reports: None.  Denies: Varicose


Respiratory Surgical History: Reports: None.  Denies: Thoracentesis


Male  Surgical History: Reports: Circumcision.  Denies: TURP-Transurethral 

Resection of Prostate, Vasectomy


Other Male  Surgeries/Procedures: Circumcision as an infant


Endocrine Surgical History: Reports: None.  Denies: Thyroid Biopsy


Neurological Surgical History: Reports: None.  Denies: C-Spine, Discectomy, 

Intracranial, Laminectomy, Lumbar Spine, Sacral Spine, Spinal Fusion, 

Vertebroplasty


Musculoskeletal Surgical History: Reports: None.  Denies: Arthroscopic Procedure

, Carpal Tunnel, Ganglion Cyst, Joint Replacement, ORIF, Shoulder Surgery


Oncologic Surgical History: Reports: None


Dermatological Surgical History: Reports: None





- Past Imaging History


Past Imaging History: Reports: CAT Scan (CT of the cervical spine in 2017), MRI 

(MRI of lumbar spine in about 2008)





Social & Family History





- Family History


Family Medical History: Noncontributory (for complaint)





- Tobacco Use


Smoking Status *Q: Current Every Day Smoker





- Caffeine Use


Caffeine Use: Reports: Coffee, Energy Drinks, Soda


Caffeine Use Comment: Rarely





- Living Situation & Occupation


Living situation: Reports:  (2007, 3 children), with Family (Wife and 

children)


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





H&P Review of Systems





- Review of Systems:


Review Of Systems: See Below


General: Reports: Chills, Malaise, Weakness, Fatigue, Decreased Appetite


HEENT: Reports: No Symptoms


Pulmonary: Reports: Shortness of Breath, Cough, Other (dry cough)


Cardiovascular: Reports: Chest Pain, Orthopnea (sleeps propped on 8 pillows)


Gastrointestinal: Reports: Diarrhea, Decreased Appetite, Nausea


Genitourinary: Reports: No Symptoms


Musculoskeletal: Reports: Other (body aches)


Skin: Reports: No Symptoms


Psychiatric: Reports: Anxiety


Neurological: Reports: Dizziness, Weakness


Hematologic/Lymphatic: Reports: No Symptoms


Immunologic: Reports: No Symptoms





Exam





- Exam


Exam: See Below





- Vital Signs


Vital Signs: 


 Last Vital Signs











Temp  36.4 C   01/01/20 16:36


 


Pulse  113 H  01/01/20 16:36


 


Resp  19   01/01/20 16:36


 


BP  150/92 H  01/01/20 16:36


 


Pulse Ox  96   01/01/20 16:36














- Exam


General: Alert, Oriented, Cooperative


HEENT: Conjunctiva Clear, EACs Clear, EOMI, Hearing Intact, Nares Patent, 

Posterior Pharynx Clear, Pupils Equal, Pupils Reactive, TMs Clear, Other (lips 

are dry, mucous membranes dry)


Neck: Supple, +2 Carotid Pulse wo Bruit


Lungs: Decreased Breath Sounds


Cardiovascular: Regular Rate, Regular Rhythm


GI/Abdominal Exam: Soft, Tender (mid-epigastric)


 (Male) Exam: Deferred


Rectal (Males) Exam: Deferred


Extremities: Normal Inspection, No Pedal Edema


Peripheral Pulses: 3+: Posterior Tibial (L), Posterior Tibial (R)


Skin: Warm, Dry, Intact


Neurological: Cranial Nerves Intact


Psychiatric: Alert, Anxious





- Patient Data


Lab Results Last 24 hrs: 


 Laboratory Results - last 24 hr











  01/01/20 01/01/20 Range/Units





  17:00 17:00 


 


WBC  6.4   (4.0-10.2)  K/uL


 


RBC  5.43 H   (4.33-5.41)  M/uL


 


Hgb  17.2 H D   (13.1-16.8)  g/dL


 


Hct  48.8   (39.0-49.0)  %


 


MCV  89.9  D   (84.0-98.0)  fL


 


MCH  31.7   (28.2-33.3)  pg


 


MCHC  35.2   (31.7-36.0)  g/dL


 


RDW  13.2   (11.2-14.1)  %


 


Plt Count  166  D   (150-350)  K/uL


 


Neut % (Auto)  31.1 L   (45.0-80.0)  %


 


Lymph % (Auto)  55.3 H   (10.0-50.0)  %


 


Mono % (Auto)  10.4   (2.0-14.0)  %


 


Eos % (Auto)  1.9   (0.0-5.0)  %


 


Baso % (Auto)  1.3   (0.0-2.0)  %


 


Neut # (Auto)  1.98   (1.40-7.00)  K/uL


 


Lymph # (Auto)  3.51 H   (0.50-3.50)  K/uL


 


Mono # (Auto)  0.66   (0.00-1.00)  K/uL


 


Eos # (Auto)  0.12   (0.00-0.50)  K/uL


 


Baso # (Auto)  0.08   (0.00-0.20)  K/uL


 


Sodium   140  (136-145)  mmol/L


 


Potassium   3.8  (3.5-5.1)  mmol/L


 


Chloride   102  ()  mmol/L


 


Carbon Dioxide   25.5  (21.0-32.0)  mmol/L


 


BUN   10  (7-18)  mg/dL


 


Creatinine   0.72  (0.51-1.17)  mg/dL


 


Est Cr Clr Drug Dosing   TNP  


 


Estimated GFR (MDRD)   > 60  mL/min


 


Glucose   130 H  ()  mg/dL


 


Calcium   9.2  (8.5-10.1)  mg/dL











Result Diagrams: 


 01/01/20 17:00





 01/01/20 17:00


Jose Results Last 24 hrs: 


 Microbiology











 01/01/20 16:50 Influenza Type A Antigen Screen - Final





 Nasal, Unspecified    NEGATIVE INFLUENZA A VIRUS AG





    REFERENCE RANGE: NEGATIVE





 Influenza Type B Antigen Screen - Final





    NEGATIVE INFLUENZA B VIRUS AG





    REFERENCE RANGE: NEGATIVE














Sepsis Event Note





- Evaluation


Sepsis Screening Result: No Definite Risk





*Q Meaningful Use (ADM)





- VTE Risk Assess *Q


Each Risk Factor Represents 1 Point: Age 41 - 59 years, Obesity ( BMI > 25 kg/m2

)


Total Score 1 Point Risk Factors: 2





- Problem List


(1) Dehydration


SNOMED Code(s): 85860300


   ICD Code: E86.0 - DEHYDRATION   Status: Acute   Priority: High   Onset Date: 

~01/02/20   





(2) Weakness


SNOMED Code(s): 90614392


   ICD Code: R53.1 - WEAKNESS   Status: Acute   Priority: Medium   Onset Date: ~

01/01/20   





(3) Cough


SNOMED Code(s): 32968432


   ICD Code: R05 - COUGH   Status: Acute   Priority: Medium   Onset Date: ~01/01 /20   





(4) Chest pain


SNOMED Code(s): 41823315


   ICD Code: R07.9 - CHEST PAIN, UNSPECIFIED   Status: Acute   Priority: High   

Onset Date: ~01/01/20   


Qualifiers: 


   Chest pain type: unspecified   Qualified Code(s): R07.9 - Chest pain, 

unspecified   





(5) SOB (shortness of breath)


SNOMED Code(s): 955903862


   ICD Code: R06.02 - SHORTNESS OF BREATH   Status: Acute   Priority: Medium   

Onset Date: ~01/01/20   


Problem List Initiated/Reviewed/Updated: Yes


Orders Last 24hrs: 





Patient reviewed with Dr. Montoya and will admit him to observation.  Will 

obtain EKG, Chest x-ray, CBC, CMP, Troponin, UA with micro to reflex a culture 

if positive. Activity as tolerated.  Vitals every shift. Diet will be full 

liquid and advanced as tolerated.  Continue with home medications, but may hold 

supplements.  Will give him Zofran for nausea and Protonix 40 mg IV x 1 dose.  

Ativan is available if needed for anxiety.  Labs and x-ray results are 

currently pending.  Writer will reevaluate patient in the morning.  Fasting 

labs to be obtained in the morning to include A1C and Lipid Panel.

## 2020-01-03 NOTE — PCM.PN
- General Info


Date of Service: 01/03/20


Functional Status: Reports: Tolerating Diet, Urinating





- Review of Systems


General: Reports: Chills (improved)


HEENT: Reports: No Symptoms


Pulmonary: Reports: Cough (improved)


Cardiovascular: Reports: No Symptoms


Gastrointestinal: Reports: Nausea (improved)


Genitourinary: Reports: Other (urine dark in color)


Musculoskeletal: Reports: No Symptoms


Skin: Reports: No Symptoms


Neurological: Reports: Weakness (improving)


Psychiatric: Reports: No Symptoms





- Patient Data


Vitals - Most Recent: 


 Last Vital Signs











Temp  97.9 F   01/03/20 07:50


 


Pulse  83   01/03/20 07:50


 


Resp  16   01/03/20 07:50


 


BP  155/106 H  01/03/20 16:44


 


Pulse Ox  93 L  01/03/20 07:50











Weight - Most Recent: 278 lb 4.8 oz


I&O - Last 24 Hours: 


 Intake & Output











 01/03/20 01/03/20 01/03/20





 06:59 14:59 22:59


 


Intake Total 2159 2520 


 


Output Total 0 650 325


 


Balance 2159 1870 -325











Lab Results Last 24 Hours: 


 Laboratory Results - last 24 hr











  01/03/20 01/03/20 01/03/20 Range/Units





  07:20 07:20 07:20 


 


WBC   6.2   (4.0-10.2)  K/uL


 


RBC   4.99   (4.33-5.41)  M/uL


 


Hgb   15.9  D   (13.1-16.8)  g/dL


 


Hct   46.3   (39.0-49.0)  %


 


MCV   92.8   (84.0-98.0)  fL


 


MCH   31.9   (28.2-33.3)  pg


 


MCHC   34.3   (31.7-36.0)  g/dL


 


RDW   13.4   (11.2-14.1)  %


 


Plt Count   150   (150-350)  K/uL


 


Neut % (Auto)   34.3 L   (45.0-80.0)  %


 


Lymph % (Auto)   49.8   (10.0-50.0)  %


 


Mono % (Auto)   7.9   (2.0-14.0)  %


 


Eos % (Auto)   7.4 H   (0.0-5.0)  %


 


Baso % (Auto)   0.6   (0.0-2.0)  %


 


Neut # (Auto)   2.14   (1.40-7.00)  K/uL


 


Lymph # (Auto)   3.10   (0.50-3.50)  K/uL


 


Mono # (Auto)   0.49   (0.00-1.00)  K/uL


 


Eos # (Auto)   0.46   (0.00-0.50)  K/uL


 


Baso # (Auto)   0.04   (0.00-0.20)  K/uL


 


Sodium  141    (136-145)  mmol/L


 


Potassium  3.7    (3.5-5.1)  mmol/L


 


Chloride  106    ()  mmol/L


 


Carbon Dioxide  28.8    (21.0-32.0)  mmol/L


 


BUN  12    (7-18)  mg/dL


 


Creatinine  0.74    (0.51-1.17)  mg/dL


 


Est Cr Clr Drug Dosing  121.96    mL/min


 


Estimated GFR (MDRD)  > 60    mL/min


 


Glucose  110 H    ()  mg/dL


 


Hemoglobin A1c    6.2 H  (4.3-5.7)  %


 


Calcium  8.2 L D    (8.5-10.1)  mg/dL


 


Total Bilirubin  1.5 H    (0.2-1.0)  mg/dL


 


AST  136 H    (15-37)  U/L


 


ALT  104 H    (12-78)  U/L


 


Alkaline Phosphatase  79    ()  IU/L


 


NT-Pro-B Natriuret Pep     (0-125)  pg/mL


 


Total Protein  7.4    (6.4-8.2)  g/dL


 


Albumin  3.3 L    (3.4-5.0)  g/dL


 


Triglycerides  243 H    ()  mg/dL


 


Cholesterol  216 H    (100-200)  mg/dL


 


LDL Cholesterol, Calc  116 H    (0-100)  mg/dL


 


HDL Cholesterol  51    (40-60)  mg/dL


 


Amylase  46    ()  U/L


 


Lipase  273    ()  U/L














  01/03/20 Range/Units





  07:20 


 


WBC   (4.0-10.2)  K/uL


 


RBC   (4.33-5.41)  M/uL


 


Hgb   (13.1-16.8)  g/dL


 


Hct   (39.0-49.0)  %


 


MCV   (84.0-98.0)  fL


 


MCH   (28.2-33.3)  pg


 


MCHC   (31.7-36.0)  g/dL


 


RDW   (11.2-14.1)  %


 


Plt Count   (150-350)  K/uL


 


Neut % (Auto)   (45.0-80.0)  %


 


Lymph % (Auto)   (10.0-50.0)  %


 


Mono % (Auto)   (2.0-14.0)  %


 


Eos % (Auto)   (0.0-5.0)  %


 


Baso % (Auto)   (0.0-2.0)  %


 


Neut # (Auto)   (1.40-7.00)  K/uL


 


Lymph # (Auto)   (0.50-3.50)  K/uL


 


Mono # (Auto)   (0.00-1.00)  K/uL


 


Eos # (Auto)   (0.00-0.50)  K/uL


 


Baso # (Auto)   (0.00-0.20)  K/uL


 


Sodium   (136-145)  mmol/L


 


Potassium   (3.5-5.1)  mmol/L


 


Chloride   ()  mmol/L


 


Carbon Dioxide   (21.0-32.0)  mmol/L


 


BUN   (7-18)  mg/dL


 


Creatinine   (0.51-1.17)  mg/dL


 


Est Cr Clr Drug Dosing   mL/min


 


Estimated GFR (MDRD)   mL/min


 


Glucose   ()  mg/dL


 


Hemoglobin A1c   (4.3-5.7)  %


 


Calcium   (8.5-10.1)  mg/dL


 


Total Bilirubin   (0.2-1.0)  mg/dL


 


AST   (15-37)  U/L


 


ALT   (12-78)  U/L


 


Alkaline Phosphatase   ()  IU/L


 


NT-Pro-B Natriuret Pep  28  (0-125)  pg/mL


 


Total Protein   (6.4-8.2)  g/dL


 


Albumin   (3.4-5.0)  g/dL


 


Triglycerides   ()  mg/dL


 


Cholesterol   (100-200)  mg/dL


 


LDL Cholesterol, Calc   (0-100)  mg/dL


 


HDL Cholesterol   (40-60)  mg/dL


 


Amylase   ()  U/L


 


Lipase   ()  U/L











Med Orders - Current: 


 Current Medications





Albuterol (Ventolin Hfa)  8.5 gm INH ASDIRECTED PRN


   PRN Reason: Dyspnea


Albuterol/Ipratropium (Duoneb 3.0-0.5 Mg/3 Ml)  3 ml INH QID PRN


   PRN Reason: Shortness of Breath


Amitriptyline HCl (Elavil)  50 mg PO DAILY@1700 [facility]


   Last Admin: 01/03/20 17:16 Dose:  50 mg


Benzonatate (Tessalon Perles)  200 mg PO TID PRN


   PRN Reason: Cough


   Last Admin: 01/03/20 08:42 Dose:  200 mg


Gabapentin (Neurontin)  800 mg PO BID@0800,2000 [facility]


   Last Admin: 01/03/20 07:46 Dose:  800 mg


Ceftriaxone Sodium 1 gm/ (Sodium Chloride)  100 mls @ 200 mls/hr IV Q24H [facility]


   Last Admin: 01/03/20 11:28 Dose:  200 mls/hr


Influenza Virus Vaccine (Fluzone Quad [id number] Syringe)  60 mcg IM .ONCE ONE


   Stop: 01/03/20 18:01


Levothyroxine Sodium (Levothyroxine)  25 mcg PO DAILY [facility]


   Last Admin: 01/03/20 07:46 Dose:  25 mcg


Lisinopril (Prinivil)  20 mg PO BID@0800,2000 [facility]


   Last Admin: 01/03/20 07:47 Dose:  20 mg


Lorazepam (Ativan)  1 mg PO TID PRN


   PRN Reason: Anxiety


   Last Admin: 01/03/20 07:47 Dose:  1 mg


Montelukast Sodium (Singulair)  10 mg PO DAILY [facility]


   Last Admin: 01/03/20 07:47 Dose:  10 mg


Naproxen (Naprosyn)  500 mg PO BID PRN


   PRN Reason: Pain


Ondansetron HCl (Zofran)  4 mg IVPUSH Q6H PRN


   PRN Reason: Nausea/Vomiting


Pantoprazole Sodium (Protonix***)  40 mg PO BEDTIME [facility]


   Last Admin: 01/02/20 20:25 Dose:  40 mg


Sodium Chloride (Saline Flush)  10 ml FLUSH ASDIRECTED PRN


   PRN Reason: Keep Vein Open


   Last Admin: 01/03/20 11:28 Dose:  10 ml





Discontinued Medications





Acetaminophen (Tylenol Extra Strength)  500 mg PO ONETIME ONE


   Stop: 01/02/20 17:30


   Last Admin: 01/02/20 18:01 Dose:  500 mg


Amitriptyline HCl (Elavil)  50 mg PO BEDTIME [facility]


Diphenhydramine HCl (Benadryl)  25 mg PO ONETIME ONE


   Stop: 01/02/20 17:31


   Last Admin: 01/02/20 18:00 Dose:  25 mg


Diphenhydramine HCl (Benadryl) Confirm Administered Dose 25 mg .ROUTE .STK-MED 

ONE


   Stop: 01/02/20 17:54


   Last Admin: 01/02/20 18:01 Dose:  Not Given


Furosemide (Lasix)  10 mg IVPUSH NOW ONE


   Stop: 01/03/20 08:09


   Last Admin: 01/03/20 08:41 Dose:  10 mg


Sodium Chloride (Normal Saline)  1,000 mls @ 150 mls/hr IV ASDIRECTED [facility]


   Last Admin: 01/03/20 05:27 Dose:  150 mls/hr


Influenza Virus Vaccine (Pharmacy To Dose - Influenza Vaccine)  1 each IM 

ONETIME ONE


   Stop: 01/02/20 20:30


Influenza Virus Vaccine (Fluzone Quad [id number] Syringe)  60 mcg IM .ONCE ONE


   Stop: 01/02/20 20:46


Nifedipine (Procardia)  10 mg PO ONETIME ONE


   Stop: 01/03/20 17:36


Budesonide/ (Formoterol 2 Puff))  2 puff INH BID@0800,2000 [facility]


Pantoprazole Sodium (Protonix Iv***)  40 mg IVPUSH ONETIME ONE


   Stop: 01/02/20 16:25


   Last Admin: 01/02/20 16:51 Dose:  40 mg











- Exam


General: Alert, Cooperative, No Acute Distress


HEENT: Mucous Membr. Moist/Pink


Neck: Trachea Midline, No JVD


Lungs: Normal Respiratory Effort, Decreased Breath Sounds, Crackles (bibasilar)


Cardiovascular: Regular Rate, Regular Rhythm


GI/Abdominal Exam: Soft, Non-Tender, No Distention


 (Male) Exam: Deferred


Back Exam: Normal Inspection


Extremities: Normal Inspection, Non-Tender, No Pedal Edema


Skin: Warm, Dry, Intact


Neurological: No New Focal Deficit


Psy/Mental Status: Alert, Normal Affect, Normal Mood





Sepsis Event Note





- Evaluation


Sepsis Screening Result: No Definite Risk





- Focused Exam


Vital Signs: 


 Vital Signs











  Temp Pulse Resp BP BP BP Pulse Ox


 


 01/03/20 16:44       155/106 H 


 


 01/03/20 09:47       142/97 H 


 


 01/03/20 07:50  97.9 F  83  16   152/106 H   93 L


 


 01/03/20 07:47     159/104 H   











Date Exam was Performed: 01/03/20


Time Exam was Performed: 17:40





- Problem List & Annotations


(1) UTI (urinary tract infection)


SNOMED Code(s): 71839253


   Code(s): N39.0 - URINARY TRACT INFECTION, SITE NOT SPECIFIED   Status: Acute

   Priority: High   Current Visit: Yes   


Qualifiers: 


   Urinary tract infection type: acute cystitis   Hematuria presence: without 

hematuria   Qualified Code(s): N30.00 - Acute cystitis without hematuria   





(2) Asthma


SNOMED Code(s): 376830079


   Code(s): J45.909 - UNSPECIFIED ASTHMA, UNCOMPLICATED   Status: Chronic   

Priority: Medium   Current Visit: No   


Qualifiers: 


   Asthma severity: moderate   Asthma persistence: persistent   Asthma 

complication type: with acute exacerbation   Qualified Code(s): J45.41 - 

Moderate persistent asthma with (acute) exacerbation   





(3) Respiratory infection, upper


SNOMED Code(s): 72257906


   Code(s): J06.9 - ACUTE UPPER RESPIRATORY INFECTION, UNSPECIFIED   Status: 

Acute   Priority: High   Current Visit: No   Onset Date: ~12/27/17   


Qualifiers: 


   URI type: unspecified viral URI   Qualified Code(s): J06.9 - Acute upper 

respiratory infection, unspecified   





(4) Cough


SNOMED Code(s): 13320063


   Code(s): R05 - COUGH   Status: Acute   Priority: Medium   Current Visit: No 

  Onset Date: ~01/01/20   





(5) SOB (shortness of breath)


SNOMED Code(s): 964466851


   Code(s): R06.02 - SHORTNESS OF BREATH   Status: Acute   Priority: Medium   

Current Visit: No   Onset Date: ~01/01/20   





(6) Dehydration


SNOMED Code(s): 38601577


   Code(s): E86.0 - DEHYDRATION   Status: Acute   Priority: High   Current Visit

: No   Onset Date: ~01/02/20   





(7) Weakness


SNOMED Code(s): 33064688


   Code(s): R53.1 - WEAKNESS   Status: Acute   Priority: Medium   Current Visit

: No   Onset Date: ~01/01/20   





(8) Hypertension


SNOMED Code(s): 72053609


   Code(s): I10 - ESSENTIAL (PRIMARY) HYPERTENSION   Status: Chronic   Priority

: Medium   Current Visit: No   


Qualifiers: 


   Hypertension type: essential hypertension   Qualified Code(s): I10 - 

Essential (primary) hypertension   





(9) Elevated LFTs


SNOMED Code(s): 991113086, 935916708


   Code(s): R94.5 - ABNORMAL RESULTS OF LIVER FUNCTION STUDIES   Status: Acute 

  Priority: High   Current Visit: Yes   





(10) COPD (chronic obstructive pulmonary disease)


SNOMED Code(s): 83781433


   Code(s): J44.9 - CHRONIC OBSTRUCTIVE PULMONARY DISEASE, UNSPECIFIED   Status

: Acute   Current Visit: Yes   


Qualifiers: 


   COPD type: chronic bronchitis   Chronic bronchitis type: mixed simple and 

mucopurulent   Qualified Code(s): J41.8 - Mixed simple and mucopurulent chronic 

bronchitis   





- Problem List Review


Problem List Initiated/Reviewed/Updated: Yes





- My Orders


Last 24 Hours: 


My Active Orders





01/04/20 05:11


BASIC METABOLIC PANEL,BMP [CHEM] Routine 


CBC WITH AUTO DIFF [HEME] Routine 


HEPATIC FUNCTION PANEL,HFP [CHEM] Routine 


HEPATITIS PANEL (4) [REF] Routine 














- Plan


Plan:: 





01/03/2020


Gino Chu MD


He is feeling a lot better although blood pressure markedly elevated at this 

time. He thinks his blood pressure is elevated because he thinks he threw up 

his blood pressure medications over the last couple of days. He says his 

shortness of breath is from his COPD and his asthma. I told him his LFTs were 

elevated. He said he knows the reason. He said he normally drinks ~3 beers a 

day but he drank 9-15 high tall drinks New's Year Keiry because he is going to 

quit alcohol all together so he had a last hurray.

## 2020-01-04 NOTE — [id number]
**Discharge Summary





- Hospital Course


HPI Initial Comments: 





See admission H&P


Brief History: See admission H&P


Diagnosis: Stroke: No


Modified [place] Scale: No Symptoms at All


Modified [place] Scale Score: 0





- Discharge Data


Discharge Date: 01/04/20


Discharge Disposition: Home, Self-Care 01


Condition: Good





- Referral to Home Health


Primary Care Physician: 


PCP Unknown








- Discharge Diagnosis/Problem(s)


(1) Elevated LFTs


SNOMED Code(s): 006071797, 536938534


   ICD Code: R94.5 - ABNORMAL RESULTS OF LIVER FUNCTION STUDIES   Status: Acute

   Priority: High   Current Visit: Yes   Onset Date: 01/02/20   Problem Details

: Apparent previous history of LFTs elevation possibly secondary to his alcohol 

abuse, which patient denies at this time, including no previous history of DWIs

, etc. Significant progression of his LFTs elevation likely secondary to his 

additional IV Rocephin therapy. Note the patient did have significant alcohol 

intake on 12/31, however he has made a New Year's resolution to stop both his 

alcohol and tobacco use. Various therapeutic options were discussed with the 

patient, who is requesting to go home secondary to his son's birthday party 

later today. Patient also has previous history with hyperlipidemia including 

previous therapy, however not currently. Fatty liver may be a contributive 

factor. Recommend regular provider order abdominal ultrasound time of follow-up 

visit as per discharge instructions. Hygiene precautions discussed since acute 

hepatitis has not been ruled out to this point.   





(2) UTI (urinary tract infection)


SNOMED Code(s): 37099262


   ICD Code: N39.0 - URINARY TRACT INFECTION, SITE NOT SPECIFIED   Status: 

Acute   Priority: High   Current Visit: Yes   Onset Date: ~01/02/20   Problem 

Details: Patient was placed in observation status for treatment of his UTI with 

IV Rocephin initiated as above. Secondary to progressive LFTs elevation this 

will be discontinued, however. Macrobid emergency room prescription provided 

with more extended antibiotic therapy to be prescribed by his regular provider 

at time of follow-up. Macrobid should be continued with caution secondary to 

low possibility of progression of his LFTs elevation with this medication. 

Urine culture is negative to this point.   


Qualifiers: 


   Urinary tract infection type: acute cystitis   Hematuria presence: without 

hematuria   Qualified Code(s): N30.00 - Acute cystitis without hematuria   





(3) COPD (chronic obstructive pulmonary disease)


SNOMED Code(s): 45435686


   ICD Code: J44.9 - CHRONIC OBSTRUCTIVE PULMONARY DISEASE, UNSPECIFIED   Status

: Chronic   Priority: Medium   Current Visit: Yes   Problem Details: Patient 

was given nebulizer treatment during this hospitalization with no significant 

exacerbation of his COPD/asthma or evidence of current bronchitic type 

symptoms. He does have inhaler and a nebulizer at home. Consider PFT  by his 

regular provider depending on his clinical course. at follow-up.   


Qualifiers: 


   COPD type: chronic bronchitis   Chronic bronchitis type: mixed simple and 

mucopurulent   Qualified Code(s): J41.8 - Mixed simple and mucopurulent chronic 

bronchitis   





(4) Dehydration


SNOMED Code(s): 46844855


   ICD Code: E86.0 - DEHYDRATION   Status: Chronic   Priority: High   Current 

Visit: Yes   Onset Date: ~01/02/20   Problem Details: Resolved with IV fluids. 

Note previous borderline polycythemia on admission, which has also resolved.   





(5) Hypertension


SNOMED Code(s): 84098000


   ICD Code: I10 - ESSENTIAL (PRIMARY) HYPERTENSION   Status: Chronic   Priority

: Medium   Current Visit: Yes   Problem Details: Blood pressures under poor 

control during this hospitalization. Norvasc therapy was initiated in the 

emergency room with additional emergency room prescription provided secondary 

to weekend rounding. Further medication adjustment and refills by his regular 

provider.   


Qualifiers: 


   Hypertension type: essential hypertension   Qualified Code(s): I10 - 

Essential (primary) hypertension   





(6) Osteoarthritis


SNOMED Code(s): 357253520


   ICD Code: M19.90 - UNSPECIFIED OSTEOARTHRITIS, UNSPECIFIED SITE   Status: 

Chronic   Priority: Medium   Current Visit: Yes   Problem Details: Stable by 

history   


Qualifiers: 


   Osteoarthritis location: multiple joints   Osteoarthritis type: primary   

Qualified Code(s): M15.0 - Primary generalized (osteo)arthritis   





(7) Peptic reflux disease


SNOMED Code(s): 166961006


   ICD Code: K21.9 - GASTRO-ESOPHAGEAL REFLUX DISEASE WITHOUT ESOPHAGITIS   

Status: Chronic   Priority: Medium   Current Visit: Yes   Problem Details: 

Stable under current medical therapy   





(8) Tobacco abuse counseling


SNOMED Code(s): 811656525, 142839364, 481658193


   ICD Code: Z71.6 - TOBACCO ABUSE COUNSELING   Status: Chronic   Priority: 

Medium   Current Visit: Yes   Problem Details: The patient was congratulated 

about wanting to stop smoking. Tobacco cessation strongly encouraged with 

tobacco cessation information provided at discharge.   





(9) Obesity (BMI 35.0-39.9 without comorbidity)


SNOMED Code(s): 978902187, 609638983


   ICD Code: E66.9 - OBESITY, UNSPECIFIED   Status: Chronic   Priority: Medium 

  Current Visit: Yes   Problem Details: Weight loss in moderation advised 

especially in light of hyperlipidemia as below with consideration of statin 

therapy once his LFTs elevation has been clarified/resolved.   





(10) Hypothyroidism (acquired)


SNOMED Code(s): 665094757


   ICD Code: E03.9 - HYPOTHYROIDISM, UNSPECIFIED   Status: Chronic   Priority: 

Medium   Current Visit: Yes   Problem Details: Currently under therapy. 

Continue to observe closely by regular provider.   





(11) Hyperlipidemia


SNOMED Code(s): 29963936


   ICD Code: E78.5 - HYPERLIPIDEMIA, UNSPECIFIED   Status: Chronic   Priority: 

Medium   Current Visit: Yes   Problem Details: Lipid panel elevated during this 

hospitalization. Dietary recommendations provided at discharge. No medical 

therapy for now secondary to his current LFTs elevation.   


Qualifiers: 


   Hyperlipidemia type: mixed hyperlipidemia   Qualified Code(s): E78.2 - Mixed 

hyperlipidemia   





(12) Hypoalbuminemia


SNOMED Code(s): 803043658


   ICD Code: E88.09 - [facility] DISORDERS OF PLASMA-PROTEIN METABOLISM, NEC   Status: 

Acute   Priority: Medium   Current Visit: Yes   Onset Date: ~01/03/20   Problem 

Details: Resolved at time of discharge.   





(13) Hyperglycemia


SNOMED Code(s): 65175241


   ICD Code: R73.9 - HYPERGLYCEMIA, UNSPECIFIED   Status: Acute   Current Visit

: Yes   Onset Date: ~01/02/20   Problem Details: Improved at time of discharge. 

Glycosylated hemoglobin at follow-up as per discharge instructions.   





- Patient Summary/Data


Operative Procedure(s) Performed: None


Complications: Progressive LFTs elevation as above


Consults: 





None


Labs Pending at D/C: 





1. Blood culture 2 results


2. Acute hepatitis panel results


Recommended Follow-up Testing/Procedures: 





As per discharge instructions


Planned Operative Procedure(s) after DC: None


Hospital Course: 





Patient was placed in observation for treatment of his UTI with mild LFTs 

elevation on admission. Note LFTs progression likely secondary to IV Rocephin 

therapy during this hospitalization with otherwise treatment as above. Patient 

is requesting to go home as above. Was followed by regular provider as per 

discharge instructions and as above.





- Patient Instructions


Diet: Heart Healthy Diet


Diet, Other: Low carbohydrate


Activity: As Tolerated


Driving: May Drive Today


Showering/Bathing: May Shower


Notify Provider of: Fever, Increased Pain, Nausea and/or Vomiting


Other/Special Instructions: 1. Followup with your regular provider in 2 days as 

directed for reevaluation and recommended CBC, comprehensive metabolic panel, 

glycosylated hemoglobin, INR, PTT, and outpatient abdominal ultrasound for LFTs 

elevation. Bring these discharge instructions with you to that visit.  2. 

Further prescriptions for new medications including amlodipine and 

nitrofurantoin at that time by your regular provider depending on above testing 

results, etc.  3.  Strict hygiene precautions as discussed and secondary your 

liver enzyme elevations today's improve and acute hepatitis has been ruled out.

  4.  Strict avoidance of all alcohol, Tylenol, Aleve, ibuprofen, and any other 

NSAIDs until released by your regular provider after her LFTs have improved.  

5.  Stop all tobacco use ASAP as directed/per provided information and consider 

contacting Quit [employer]ne, etc..  6.  Congraduations about trying to stop smoking 

and discontinuation of all alcohol.  7.  Rocephin should be added to your 

intolerance list, i.e., LFTs elevation.  8.  Immediately after this visit 

verify that your cellular telephone's voicemail has been activated and is 

empty. Also verify that your  home telephone's answering machine is operating 

properly and has space to receive messages. Note that it is sometimes necessary 

for us to be able to contact you at a later date to discuss your medical care.  

9.   Please remember that we are ALWAYS here for you and want to answer any 

questions you may have. Feel free to call the hospital any time and we call you 

back ASAP.





- Discharge Plan


*PRESCRIPTION DRUG MONITORING PROGRAM REVIEWED*: Not Applicable


*COPY OF PRESCRIPTION DRUG MONITORING REPORT IN PATIENT EDE: Not Applicable


Prescriptions/Med Rec: 


amLODIPine [Norvasc] 5 mg PO DAILY #10 tablet


Nitrofurantoin Monohyd/M-Cryst [Macrobid 100 mg Capsule] 100 mg PO BID #6 

capsule


Home Medications: 


 Home Meds





Albuterol Sulfate [Proair Hfa] 8.5 gm IH ASDIRECTED PRN 01/02/18 [History]


Montelukast [Singulair] 10 mg PO DAILY 01/02/18 [History]


Pantoprazole [ProTONIX***] 40 mg PO DAILY 01/02/18 [History]


Non-Formulary Medication [NF Drug] 1 applic TOP DAILY 02/28/18 [History]


Levothyroxine 25 mcg PO DAILY 01/01/20 [History]


Albuterol/Ipratropium [DuoNeb 3.0-0.5 MG/3 ML] 3 ml INH QID PRN 01/02/20 [

History]


Amitriptyline [Elavil] 50 mg PO BEDTIME 01/02/20 [History]


Benazepril [Lotensin] 20 mg PO BID@0800,2000 01/02/20 [History]


Budesonide/Formoterol [Symbicort 160-4.5 MCG] 2 puff INH BID@0800,2000 01/02/20 

[History]


Gabapentin [Neurontin] 800 mg PO BID@0800,2000 01/02/20 [History]


Nitrofurantoin Monohyd/M-Cryst [Macrobid 100 mg Capsule] 100 mg PO BID #6 

capsule 01/04/20 [Rx]


amLODIPine [Norvasc] 5 mg PO DAILY #10 tablet 01/04/20 [Rx]








Oxygen Therapy Mode: Room Air


Patient Handouts:  Diet and Hepatitis, Alcoholic Liver Disease, Easy-to-Read, 

Fat and Cholesterol Restricted Eating Plan, Easy-to-Read, Urinary Tract 

Infection, Adult, Easy-to-Read, Health Risks of Smoking, Steps to Quit Smoking, 

Easy-to-Read, Amlodipine tablets, Nitrofurantoin tablets or capsules





- Discharge Summary/Plan Comment


DC Time >30 min.: Yes (Coordination of care )


Discharge Summary/Plan Comment: 





As above. Extensive precautions were given to the patient, who is in agreement 

with the treatment plan. See Patient Instructions for further treatment and 

plan.





NOTE: Admission H&P not available/not finalized at time of rounds today.





- General Info


Date of Service: 01/04/20


Admission Dx/Problem (Free Text: 





1.  UTI


2.  LFTs elevation


3.  COPD


4.  Dehydration


Functional Status: Reports: Pain Controlled, Tolerating Diet, Ambulating, 

Urinating, New Symptoms.  Denies: Incentive Spirometry


Numeric/FACES Score: 0





- Review of Systems


General: Reports: No Symptoms.  Denies: Fever, Weakness, Fatigue, Malaise, 

Chills, Night Sweats, Appetite (Good)


HEENT: Reports: Glasses.  Denies: Dysphasia, Ear Pain, Eye Pain, Headaches, 

Post Nasal Drip, Sinus Congestion, Sore Throat, Rhinitis, Visual Changes


Pulmonary: Reports: No Symptoms.  Denies: Shortness of Breath, Pleuritic Chest 

Pain, Cough, Sputum, Hemoptysis, Wheezing


Cardiovascular: Reports: No Symptoms.  Denies: Chest Pain, Palpitations, 

Dyspnea on Exertion, Orthopnea, PND, Edema, Lightheadedness


Gastrointestinal: Reports: No Symptoms, Other (Normal bowel movement earlier 

today).  Denies: Abdominal Pain, Constipation, Diarrhea, Difficulty Swallowing, 

Flatus, Melena, Nausea, Vomiting


Genitourinary: Reports: No Symptoms.  Denies: Dysuria, Frequency, Burning, Pain

, Urgency, Incontinence, Hematuria, Retention, Flank Pain


Musculoskeletal: Reports: No Symptoms.  Denies: Neck Pain, Shoulder Pain, Arm 

Pain, Back Pain, Leg Pain


Skin: Reports: No Symptoms.  Denies: Cyanosis, Jaundice, Diaphoresis, Bruising, 

Pruritis, Rash


Neurological: Reports: No Symptoms.  Denies: Confusion, Dizziness, Numbness, 

Paresthesia, Tingling, Weakness


Psychiatric: Reports: No Symptoms.  Denies: Confusion, Depression, Anxiety, 

Agitation, Cravings, Hallucinations





- Patient Data


Vitals - Most Recent: 


 Last Vital Signs











Temp  36.6 C   01/04/20 07:44


 


Pulse  91   01/04/20 07:44


 


Resp  20   01/04/20 07:44


 


BP  143/97 H  01/04/20 07:44


 


Pulse Ox  98   01/04/20 07:44








 Vital Signs - 24 hr











  01/03/20 01/03/20 01/03/20





  16:44 17:50 20:00


 


Temperature [   36.9 C





Temporal]   


 


Pulse,   110 H





Peripheral [   





Pulse Oximetry]   


 


Respiratory   16





Rate   


 


Blood Pressure  196/104 H 


 


Blood Pressure   131/87





[Left Upper Arm   





]   


 


Blood Pressure 155/106 H  





[Right Upper   





Arm]   


 


O2 Sat by Pulse   94 L





Oximetry   














  01/03/20 01/04/20 01/04/20





  20:23 07:41 07:44


 


Temperature [   36.6 C





Temporal]   


 


Pulse,   91





Peripheral [   





Pulse Oximetry]   


 


Respiratory   20





Rate   


 


Blood Pressure 142/97 H 143/97 H 


 


Blood Pressure   143/97 H





[Left Upper Arm   





]   


 


Blood Pressure   





[Right Upper   





Arm]   


 


O2 Sat by Pulse   98





Oximetry   











Weight - Most Recent: 126.235 kg


I&O - Last 24 hours: 


 Intake & Output











 01/03/20 01/04/20 01/04/20





 22:59 06:59 14:59


 


Intake Total 700 300 


 


Output Total 325 400 


 


Balance 375 -100 











Imaging Impressions - Last 24 hrs: 





Chest x-ray, PA and lateral, report from 1/2/2020 showed no evidence of 

infection, etc.


Lab Results - Last 24 hrs: 


 Laboratory Results - last 24 hr











  01/04/20 01/04/20 Range/Units





  06:57 06:57 


 


WBC  7.2   (4.0-10.2)  K/uL


 


RBC  5.17   (4.33-5.41)  M/uL


 


Hgb  16.3   (13.1-16.8)  g/dL


 


Hct  47.5   (39.0-49.0)  %


 


MCV  91.9   (84.0-98.0)  fL


 


MCH  31.5   (28.2-33.3)  pg


 


MCHC  34.3   (31.7-36.0)  g/dL


 


RDW  13.0   (11.2-14.1)  %


 


Plt Count  155   (150-350)  K/uL


 


Neut % (Auto)  40.8 L   (45.0-80.0)  %


 


Lymph % (Auto)  45.2   (10.0-50.0)  %


 


Mono % (Auto)  6.2   (2.0-14.0)  %


 


Eos % (Auto)  7.2 H   (0.0-5.0)  %


 


Baso % (Auto)  0.6   (0.0-2.0)  %


 


Neut # (Auto)  2.95   (1.40-7.00)  K/uL


 


Lymph # (Auto)  3.27   (0.50-3.50)  K/uL


 


Mono # (Auto)  0.45   (0.00-1.00)  K/uL


 


Eos # (Auto)  0.52 H   (0.00-0.50)  K/uL


 


Baso # (Auto)  0.04   (0.00-0.20)  K/uL


 


Sodium   141  (136-145)  mmol/L


 


Potassium   3.5  (3.5-5.1)  mmol/L


 


Chloride   104  ()  mmol/L


 


Carbon Dioxide   29.0  (21.0-32.0)  mmol/L


 


BUN   9  (7-18)  mg/dL


 


Creatinine   0.69  (0.51-1.17)  mg/dL


 


Est Cr Clr Drug Dosing   130.80  mL/min


 


Estimated GFR (MDRD)   > 60  mL/min


 


Glucose   116 H  ()  mg/dL


 


Calcium   8.7  (8.5-10.1)  mg/dL


 


Total Bilirubin   1.3 H  (0.2-1.0)  mg/dL


 


Direct Bilirubin   0.4 H  (0.0-0.2)  mg/dL


 


Indirect Bilirubin   0.9  


 


AST   202 H  (15-37)  U/L


 


ALT   160 H  (12-78)  U/L


 


Alkaline Phosphatase   86  ()  IU/L


 


C-Reactive Protein   1.0 H  (<=0.9)  mg/dL


 


Total Protein   7.8  (6.4-8.2)  g/dL


 


Albumin   3.5  (3.4-5.0)  g/dL


 


Globulin   4.3  


 


Albumin/Globulin Ratio   0.81  








 Laboratory Tests











  01/02/20 01/02/20 01/02/20 Range/Units





  15:45 15:45 15:45 


 


WBC  Cancelled   7.3  


 


RBC  Cancelled   5.61 H  


 


Hgb  Cancelled   17.9 H  


 


Hct  Cancelled   51.1 H  


 


MCV  Cancelled   91.1  


 


MCH  Cancelled   31.9  


 


MCHC  Cancelled   35.0  


 


RDW  Cancelled   13.3  


 


Plt Count  Cancelled   177  


 


Neut % (Auto)  Cancelled   38.4 L  


 


Lymph % (Auto)  Cancelled   46.3  


 


Mono % (Auto)  Cancelled   11.0  


 


Eos % (Auto)  Cancelled   3.3  


 


Baso % (Auto)  Cancelled   1.0  


 


Neut # (Auto)  Cancelled   2.80  


 


Lymph # (Auto)  Cancelled   3.37  


 


Mono # (Auto)  Cancelled   0.80  


 


Eos # (Auto)  Cancelled   0.24  


 


Baso # (Auto)  Cancelled   0.07  


 


Add Manual Diff  Cancelled    


 


Sodium   Cancelled   


 


Potassium   Cancelled   


 


Chloride   Cancelled   


 


Carbon Dioxide   Cancelled   


 


BUN   Cancelled   


 


Creatinine   Cancelled   


 


Est Cr Clr Drug Dosing   Cancelled   


 


Estimated GFR (MDRD)   Cancelled   


 


Glucose   Cancelled   


 


Hemoglobin A1c     (4.3-5.7)  %


 


Calcium   Cancelled   


 


Total Bilirubin   Cancelled   


 


Direct Bilirubin     (0.0-0.2)  mg/dL


 


Indirect Bilirubin     


 


AST   Cancelled   


 


ALT   Cancelled   


 


Alkaline Phosphatase   Cancelled   


 


Troponin I   Cancelled   


 


C-Reactive Protein     (<=0.9)  mg/dL


 


NT-Pro-B Natriuret Pep     (0-125)  pg/mL


 


Total Protein   Cancelled   


 


Albumin   Cancelled   


 


Globulin     


 


Albumin/Globulin Ratio     


 


Triglycerides     ()  mg/dL


 


Cholesterol     (100-200)  mg/dL


 


LDL Cholesterol, Calc     (0-100)  mg/dL


 


HDL Cholesterol     (40-60)  mg/dL


 


Amylase     ()  U/L


 


Lipase     ()  U/L


 


Specimen Type     


 


Urine Color     


 


Urine Appearance     


 


Urine pH     (5.0-9.0)  


 


Ur Specific Gravity     (1.005-1.030)  


 


Urine Protein     (NEGATIVE)  mg/dL


 


Urine Glucose (UA)     (NEGATIVE)  mg/dL


 


Urine Ketones     (NEGATIVE)  mg/dL


 


Urine Occult Blood     (NEGATIVE)  


 


Urine Nitrite     (NEGATIVE)  


 


Urine Bilirubin     (NEGATIVE)  


 


Urine Urobilinogen     (0.2-1.0)  E.U./dL


 


Ur Leukocyte Esterase     (NEGATIVE)  


 


Urine RBC     /HPF


 


Urine WBC     /HPF


 


Ur Epithelial Cells     /LPF


 


Urine Bacteria     (NONE TO FEW)  /HPF


 


Urine Mucus     (NEGATIVE)  /LPF














  01/02/20 01/02/20 01/03/20 Range/Units





  15:45 17:05 07:20 


 


WBC     


 


RBC     


 


Hgb     


 


Hct     


 


MCV     


 


MCH     


 


MCHC     


 


RDW     


 


Plt Count     


 


Neut % (Auto)     


 


Lymph % (Auto)     


 


Mono % (Auto)     


 


Eos % (Auto)     


 


Baso % (Auto)     


 


Neut # (Auto)     


 


Lymph # (Auto)     


 


Mono # (Auto)     


 


Eos # (Auto)     


 


Baso # (Auto)     


 


Add Manual Diff     


 


Sodium  141   141  


 


Potassium  3.9   3.7  


 


Chloride  104   106  


 


Carbon Dioxide  25.3   28.8  


 


BUN  12   12  


 


Creatinine  0.74   0.74  


 


Est Cr Clr Drug Dosing  TNP   121.96  


 


Estimated GFR (MDRD)  > 60   > 60  


 


Glucose  140 H   110 H  


 


Hemoglobin A1c     (4.3-5.7)  %


 


Calcium  9.7   8.2 L D  


 


Total Bilirubin  1.5 H   1.5 H  


 


Direct Bilirubin     (0.0-0.2)  mg/dL


 


Indirect Bilirubin     


 


AST  79 H   136 H  


 


ALT  83 H   104 H  


 


Alkaline Phosphatase  90   79  


 


Troponin I  0.000    


 


C-Reactive Protein     (<=0.9)  mg/dL


 


NT-Pro-B Natriuret Pep     (0-125)  pg/mL


 


Total Protein  8.9 H   7.4  


 


Albumin  4.2   3.3 L  


 


Globulin     


 


Albumin/Globulin Ratio     


 


Triglycerides    243 H  ()  mg/dL


 


Cholesterol    216 H  (100-200)  mg/dL


 


LDL Cholesterol, Calc    116 H  (0-100)  mg/dL


 


HDL Cholesterol    51  (40-60)  mg/dL


 


Amylase    46  ()  U/L


 


Lipase    273  ()  U/L


 


Specimen Type   .   


 


Urine Color   Nahed   


 


Urine Appearance   Slightly cloudy   


 


Urine pH   6.0   (5.0-9.0)  


 


Ur Specific Gravity   1.020   (1.005-1.030)  


 


Urine Protein   100 H   (NEGATIVE)  mg/dL


 


Urine Glucose (UA)   Negative   (NEGATIVE)  mg/dL


 


Urine Ketones   15 H   (NEGATIVE)  mg/dL


 


Urine Occult Blood   Negative   (NEGATIVE)  


 


Urine Nitrite   Positive H   (NEGATIVE)  


 


Urine Bilirubin   Small H   (NEGATIVE)  


 


Urine Urobilinogen   1.0   (0.2-1.0)  E.U./dL


 


Ur Leukocyte Esterase   Negative   (NEGATIVE)  


 


Urine RBC   0-5   /HPF


 


Urine WBC   0-5   /HPF


 


Ur Epithelial Cells   Few   /LPF


 


Urine Bacteria   Few   (NONE TO FEW)  /HPF


 


Urine Mucus   Many H   (NEGATIVE)  /LPF














  01/03/20 01/03/20 01/03/20 Range/Units





  07:20 07:20 07:20 


 


WBC  6.2    


 


RBC  4.99    


 


Hgb  15.9  D    


 


Hct  46.3    


 


MCV  92.8    


 


MCH  31.9    


 


MCHC  34.3    


 


RDW  13.4    


 


Plt Count  150    


 


Neut % (Auto)  34.3 L    


 


Lymph % (Auto)  49.8    


 


Mono % (Auto)  7.9    


 


Eos % (Auto)  7.4 H    


 


Baso % (Auto)  0.6    


 


Neut # (Auto)  2.14    


 


Lymph # (Auto)  3.10    


 


Mono # (Auto)  0.49    


 


Eos # (Auto)  0.46    


 


Baso # (Auto)  0.04    


 


Add Manual Diff     


 


Sodium     


 


Potassium     


 


Chloride     


 


Carbon Dioxide     


 


BUN     


 


Creatinine     


 


Est Cr Clr Drug Dosing     


 


Estimated GFR (MDRD)     


 


Glucose     


 


Hemoglobin A1c   6.2 H   (4.3-5.7)  %


 


Calcium     


 


Total Bilirubin     


 


Direct Bilirubin     (0.0-0.2)  mg/dL


 


Indirect Bilirubin     


 


AST     


 


ALT     


 


Alkaline Phosphatase     


 


Troponin I     


 


C-Reactive Protein     (<=0.9)  mg/dL


 


NT-Pro-B Natriuret Pep    28  (0-125)  pg/mL


 


Total Protein     


 


Albumin     


 


Globulin     


 


Albumin/Globulin Ratio     


 


Triglycerides     ()  mg/dL


 


Cholesterol     (100-200)  mg/dL


 


LDL Cholesterol, Calc     (0-100)  mg/dL


 


HDL Cholesterol     (40-60)  mg/dL


 


Amylase     ()  U/L


 


Lipase     ()  U/L


 


Specimen Type     


 


Urine Color     


 


Urine Appearance     


 


Urine pH     (5.0-9.0)  


 


Ur Specific Gravity     (1.005-1.030)  


 


Urine Protein     (NEGATIVE)  mg/dL


 


Urine Glucose (UA)     (NEGATIVE)  mg/dL


 


Urine Ketones     (NEGATIVE)  mg/dL


 


Urine Occult Blood     (NEGATIVE)  


 


Urine Nitrite     (NEGATIVE)  


 


Urine Bilirubin     (NEGATIVE)  


 


Urine Urobilinogen     (0.2-1.0)  E.U./dL


 


Ur Leukocyte Esterase     (NEGATIVE)  


 


Urine RBC     /HPF


 


Urine WBC     /HPF


 


Ur Epithelial Cells     /LPF


 


Urine Bacteria     (NONE TO FEW)  /HPF


 


Urine Mucus     (NEGATIVE)  /LPF














  01/04/20 01/04/20 Range/Units





  06:57 06:57 


 


WBC  7.2   


 


RBC  5.17   


 


Hgb  16.3   


 


Hct  47.5   


 


MCV  91.9   


 


MCH  31.5   


 


MCHC  34.3   


 


RDW  13.0   


 


Plt Count  155   


 


Neut % (Auto)  40.8 L   


 


Lymph % (Auto)  45.2   


 


Mono % (Auto)  6.2   


 


Eos % (Auto)  7.2 H   


 


Baso % (Auto)  0.6   


 


Neut # (Auto)  2.95   


 


Lymph # (Auto)  3.27   


 


Mono # (Auto)  0.45   


 


Eos # (Auto)  0.52 H   


 


Baso # (Auto)  0.04   


 


Add Manual Diff    


 


Sodium   141  


 


Potassium   3.5  


 


Chloride   104  


 


Carbon Dioxide   29.0  


 


BUN   9  


 


Creatinine   0.69  


 


Est Cr Clr Drug Dosing   130.80  


 


Estimated GFR (MDRD)   > 60  


 


Glucose   116 H  


 


Hemoglobin A1c    (4.3-5.7)  %


 


Calcium   8.7  


 


Total Bilirubin   1.3 H  


 


Direct Bilirubin   0.4 H  (0.0-0.2)  mg/dL


 


Indirect Bilirubin   0.9  


 


AST   202 H  


 


ALT   160 H  


 


Alkaline Phosphatase   86  


 


Troponin I    


 


C-Reactive Protein   1.0 H  (<=0.9)  mg/dL


 


NT-Pro-B Natriuret Pep    (0-125)  pg/mL


 


Total Protein   7.8  


 


Albumin   3.5  


 


Globulin   4.3  


 


Albumin/Globulin Ratio   0.81  


 


Triglycerides    ()  mg/dL


 


Cholesterol    (100-200)  mg/dL


 


LDL Cholesterol, Calc    (0-100)  mg/dL


 


HDL Cholesterol    (40-60)  mg/dL


 


Amylase    ()  U/L


 


Lipase    ()  U/L


 


Specimen Type    


 


Urine Color    


 


Urine Appearance    


 


Urine pH    (5.0-9.0)  


 


Ur Specific Gravity    (1.005-1.030)  


 


Urine Protein    (NEGATIVE)  mg/dL


 


Urine Glucose (UA)    (NEGATIVE)  mg/dL


 


Urine Ketones    (NEGATIVE)  mg/dL


 


Urine Occult Blood    (NEGATIVE)  


 


Urine Nitrite    (NEGATIVE)  


 


Urine Bilirubin    (NEGATIVE)  


 


Urine Urobilinogen    (0.2-1.0)  E.U./dL


 


Ur Leukocyte Esterase    (NEGATIVE)  


 


Urine RBC    /HPF


 


Urine WBC    /HPF


 


Ur Epithelial Cells    /LPF


 


Urine Bacteria    (NONE TO FEW)  /HPF


 


Urine Mucus    (NEGATIVE)  /LPF











Hepatitis panel pending


MELISSA Results - Last 24 hrs: 


 Microbiology











 01/03/20 11:10 Urine Culture - Preliminary





 Urine, Voided    NO GROWTH AFTER 1 DAY








Blood culture results 2 pending


Med Orders - Current: 


 Current Medications





Albuterol (Ventolin Hfa)  8.5 gm INH ASDIRECTED PRN


   PRN Reason: Dyspnea


Albuterol/Ipratropium (Duoneb 3.0-0.5 Mg/3 Ml)  3 ml INH QID PRN


   PRN Reason: Shortness of Breath


Amitriptyline HCl (Elavil)  50 mg PO DAILY@1700 [facility]


   Last Admin: 01/03/20 17:16 Dose:  50 mg


Arformoterol Tartrate (Brovana)  15 mcg NEB BIDRT [facility]


   Last Admin: 01/04/20 07:41 Dose:  15 mcg


Benzonatate (Tessalon Perles)  200 mg PO TID PRN


   PRN Reason: Cough


   Last Admin: 01/03/20 08:42 Dose:  200 mg


Budesonide (Pulmicort)  0.5 mg NEB BIDRT [facility]


   Last Admin: 01/04/20 07:41 Dose:  0.5 mg


Gabapentin (Neurontin)  800 mg PO BID@0800,2000 [facility]


   Last Admin: 01/04/20 07:40 Dose:  800 mg


Ceftriaxone Sodium 1 gm/ (Sodium Chloride)  100 mls @ 200 mls/hr IV Q24H [facility]


   Last Admin: 01/03/20 11:28 Dose:  200 mls/hr


Levothyroxine Sodium (Levothyroxine)  25 mcg PO DAILY [facility]


   Last Admin: 01/04/20 07:40 Dose:  25 mcg


Lisinopril (Prinivil)  20 mg PO BID@0800,2000 [facility]


   Last Admin: 01/04/20 07:41 Dose:  20 mg


Lorazepam (Ativan)  1 mg PO TID PRN


   PRN Reason: Anxiety


   Last Admin: 01/03/20 07:47 Dose:  1 mg


Montelukast Sodium (Singulair)  10 mg PO DAILY [facility]


   Last Admin: 01/04/20 07:40 Dose:  10 mg


Naproxen (Naprosyn)  500 mg PO BID PRN


   PRN Reason: Pain


Ondansetron HCl (Zofran)  4 mg IVPUSH Q6H PRN


   PRN Reason: Nausea/Vomiting


Pantoprazole Sodium (Protonix***)  40 mg PO BEDTIME [facility]


   Last Admin: 01/03/20 20:24 Dose:  40 mg


Sodium Chloride (Saline Flush)  10 ml FLUSH ASDIRECTED PRN


   PRN Reason: Keep Vein Open


   Last Admin: 01/03/20 11:28 Dose:  10 ml





Discontinued Medications





Acetaminophen (Tylenol Extra Strength)  500 mg PO ONETIME ONE


   Stop: 01/02/20 17:30


   Last Admin: 01/02/20 18:01 Dose:  500 mg


Amitriptyline HCl (Elavil)  50 mg PO BEDTIME [facility]


Diphenhydramine HCl (Benadryl)  25 mg PO ONETIME ONE


   Stop: 01/02/20 17:31


   Last Admin: 01/02/20 18:00 Dose:  25 mg


Diphenhydramine HCl (Benadryl) Confirm Administered Dose 25 mg .ROUTE .STK-MED 

ONE


   Stop: 01/02/20 17:54


   Last Admin: 01/02/20 18:01 Dose:  Not Given


Furosemide (Lasix)  10 mg IVPUSH NOW ONE


   Stop: 01/03/20 08:09


   Last Admin: 01/03/20 08:41 Dose:  10 mg


Sodium Chloride (Normal Saline)  1,000 mls @ 150 mls/hr IV ASDIRECTED [facility]


   Last Admin: 01/03/20 05:27 Dose:  150 mls/hr


Influenza Virus Vaccine (Pharmacy To Dose - Influenza Vaccine)  1 each IM 

ONETIME ONE


   Stop: 01/02/20 20:30


Influenza Virus Vaccine (Fluzone Quad [id number] Syringe)  60 mcg IM .ONCE ONE


   Stop: 01/02/20 20:46


   Last Admin: 01/03/20 17:45 Dose:  Not Given


Influenza Virus Vaccine (Fluzone Quad [id number] Syringe)  60 mcg IM .ONCE ONE


   Stop: 01/03/20 18:01


   Last Admin: 01/03/20 17:42 Dose:  60 mcg


Nifedipine (Procardia)  10 mg PO ONETIME ONE


   Stop: 01/03/20 17:36


   Last Admin: 01/03/20 17:50 Dose:  10 mg


Budesonide/ (Formoterol 2 Puff))  2 puff INH BID@0800,2000 [facility]


Pantoprazole Sodium (Protonix Iv***)  40 mg IVPUSH ONETIME ONE


   Stop: 01/02/20 16:25


   Last Admin: 01/02/20 16:51 Dose:  40 mg











- Exam


Quality Assessment: Reports: DVT Prophylaxis.  Denies: Supplemental Oxygen, 

Central Line/PICC, Urine Catheter, Skin Breakdown, Restraints


General: Reports: Alert, Oriented, Cooperative, No Acute Distress


HEENT: Reports: Pupils Equal, Pupils Reactive, EOMI, Mucous Membr. Moist/Pink, 

Other (Patient wearing glasses).  Denies: Scleral Icterus


Neck: Reports: Supple, Trachea Midline, No JVD, No Thyromegaly, +2 Carotid 

Pulse wo Bruit.  Denies: Lymphadenopathy


Lungs: Reports: Clear to Auscultation, Normal Respiratory Effort.  Denies: Rub


Cardiovascular: Reports: Regular Rate, Regular Rhythm, No Murmurs.  Denies: 

Gallops, Rubs


GI/Abdominal Exam: Normal Bowel Sounds, Soft, Non-Tender, No Organomegaly, No 

Distention, No Abnormal Bruit, No Mass, Pelvis Stable, Other (Obese).  No: 

Guarding


 (Male) Exam: Deferred


Rectal (Males) Exam: Deferred


Back Exam: Reports: Normal Inspection, Full Range of Motion.  Denies: CVA 

Tenderness (L), CVA Tenderness (R), Muscle Spasm


Extremities: Normal Inspection, Normal Range of Motion, Non-Tender, No Pedal 

Edema, Normal Capillary Refill.  No: Ever's Sign


Skin: Reports: Warm, Dry, Intact, Other (No jaundice. Multiple tattoos).  Denies

: Ecchymosis


Neurological: Reports: No New Focal Deficit


Psy/Mental Status: Reports: Alert, Normal Affect, Normal Mood.  Denies: Agitated

, Hallucinations, Withdrawal Symptoms